# Patient Record
Sex: FEMALE | Race: WHITE | NOT HISPANIC OR LATINO | Employment: UNEMPLOYED | URBAN - NONMETROPOLITAN AREA
[De-identification: names, ages, dates, MRNs, and addresses within clinical notes are randomized per-mention and may not be internally consistent; named-entity substitution may affect disease eponyms.]

---

## 2017-06-09 ENCOUNTER — TELEPHONE (OUTPATIENT)
Dept: GASTROENTEROLOGY | Facility: CLINIC | Age: 52
End: 2017-06-09

## 2017-07-05 DIAGNOSIS — Z12.11 SPECIAL SCREENING FOR MALIGNANT NEOPLASMS, COLON: Primary | ICD-10-CM

## 2017-08-10 RX ORDER — MELOXICAM 15 MG/1
15 TABLET ORAL DAILY
COMMUNITY
End: 2019-07-18

## 2017-08-15 ENCOUNTER — HOSPITAL ENCOUNTER (OUTPATIENT)
Facility: HOSPITAL | Age: 52
Setting detail: HOSPITAL OUTPATIENT SURGERY
Discharge: HOME | End: 2017-08-15
Attending: INTERNAL MEDICINE | Admitting: INTERNAL MEDICINE

## 2017-08-15 ENCOUNTER — ANESTHESIA EVENT (OUTPATIENT)
Dept: PERIOP | Facility: HOSPITAL | Age: 52
End: 2017-08-15

## 2017-08-15 ENCOUNTER — ANESTHESIA (OUTPATIENT)
Dept: PERIOP | Facility: HOSPITAL | Age: 52
End: 2017-08-15

## 2017-08-15 VITALS
HEART RATE: 98 BPM | BODY MASS INDEX: 33.99 KG/M2 | RESPIRATION RATE: 18 BRPM | DIASTOLIC BLOOD PRESSURE: 85 MMHG | HEIGHT: 61 IN | OXYGEN SATURATION: 97 % | SYSTOLIC BLOOD PRESSURE: 122 MMHG | WEIGHT: 180 LBS | TEMPERATURE: 97 F

## 2017-08-15 DIAGNOSIS — Z12.11 SPECIAL SCREENING FOR MALIGNANT NEOPLASMS, COLON: ICD-10-CM

## 2017-08-15 PROCEDURE — 25010000002 PROPOFOL 10 MG/ML EMULSION: Performed by: NURSE ANESTHETIST, CERTIFIED REGISTERED

## 2017-08-15 PROCEDURE — 25010000002 PROPOFOL 1000 MG/ML EMULSION: Performed by: NURSE ANESTHETIST, CERTIFIED REGISTERED

## 2017-08-15 PROCEDURE — 45385 COLONOSCOPY W/LESION REMOVAL: CPT | Performed by: INTERNAL MEDICINE

## 2017-08-15 RX ORDER — SODIUM CHLORIDE, SODIUM LACTATE, POTASSIUM CHLORIDE, CALCIUM CHLORIDE 600; 310; 30; 20 MG/100ML; MG/100ML; MG/100ML; MG/100ML
125 INJECTION, SOLUTION INTRAVENOUS CONTINUOUS
Status: DISCONTINUED | OUTPATIENT
Start: 2017-08-15 | End: 2017-08-15 | Stop reason: HOSPADM

## 2017-08-15 RX ORDER — MEPERIDINE HYDROCHLORIDE 25 MG/ML
12.5 INJECTION INTRAMUSCULAR; INTRAVENOUS; SUBCUTANEOUS
Status: DISCONTINUED | OUTPATIENT
Start: 2017-08-15 | End: 2017-08-15 | Stop reason: HOSPADM

## 2017-08-15 RX ORDER — PROPOFOL 10 MG/ML
VIAL (ML) INTRAVENOUS AS NEEDED
Status: DISCONTINUED | OUTPATIENT
Start: 2017-08-15 | End: 2017-08-15 | Stop reason: SURG

## 2017-08-15 RX ORDER — ONDANSETRON 2 MG/ML
4 INJECTION INTRAMUSCULAR; INTRAVENOUS ONCE AS NEEDED
Status: DISCONTINUED | OUTPATIENT
Start: 2017-08-15 | End: 2017-08-15 | Stop reason: HOSPADM

## 2017-08-15 RX ORDER — SODIUM CHLORIDE 0.9 % (FLUSH) 0.9 %
1-10 SYRINGE (ML) INJECTION AS NEEDED
Status: DISCONTINUED | OUTPATIENT
Start: 2017-08-15 | End: 2017-08-15 | Stop reason: HOSPADM

## 2017-08-15 RX ORDER — OXYCODONE HYDROCHLORIDE AND ACETAMINOPHEN 5; 325 MG/1; MG/1
1 TABLET ORAL ONCE AS NEEDED
Status: DISCONTINUED | OUTPATIENT
Start: 2017-08-15 | End: 2017-08-15 | Stop reason: HOSPADM

## 2017-08-15 RX ORDER — IPRATROPIUM BROMIDE AND ALBUTEROL SULFATE 2.5; .5 MG/3ML; MG/3ML
3 SOLUTION RESPIRATORY (INHALATION) ONCE AS NEEDED
Status: DISCONTINUED | OUTPATIENT
Start: 2017-08-15 | End: 2017-08-15 | Stop reason: HOSPADM

## 2017-08-15 RX ADMIN — SODIUM CHLORIDE, POTASSIUM CHLORIDE, SODIUM LACTATE AND CALCIUM CHLORIDE 125 ML/HR: 600; 310; 30; 20 INJECTION, SOLUTION INTRAVENOUS at 08:19

## 2017-08-15 RX ADMIN — PROPOFOL 140 MCG/KG/MIN: 10 INJECTION, EMULSION INTRAVENOUS at 09:06

## 2017-08-15 RX ADMIN — PROPOFOL 120 MG: 10 INJECTION, EMULSION INTRAVENOUS at 09:06

## 2017-08-15 RX ADMIN — PROPOFOL 50 MG: 10 INJECTION, EMULSION INTRAVENOUS at 09:09

## 2017-08-15 NOTE — ANESTHESIA PREPROCEDURE EVALUATION
Anesthesia Evaluation     NPO Solid Status: > 8 hours  NPO Liquid Status: > 8 hours     Airway   Mallampati: II  TM distance: >3 FB  Neck ROM: full  no difficulty expected  Dental - normal exam     Pulmonary - normal exam   Cardiovascular - normal exam    (+) hypertension,       Neuro/Psych  GI/Hepatic/Renal/Endo    (+)  diabetes mellitus type 1,     Musculoskeletal     Abdominal    Substance History      OB/GYN          Other                                        Anesthesia Plan    ASA 2     general     intravenous induction   Anesthetic plan and risks discussed with patient.

## 2017-08-15 NOTE — OP NOTE
08/15/17    COLONOSCOPY FOR SCREENING with polypectomy Procedure Note    JILLIAN Novoa  8/15/2017    Pre-op Diagnosis: Colon cancer screening, no prior colonoscopy      Post-op Diagnosis: Sigmoid polyps (2), removed        Anesthesia: Per Anesthesia service, general anesthesia      Estimated Blood Loss: Negligible      Findings: Normal rectal examination.  Colonoscopy performed to the cecum, confirmed by identification of typical cecal anatomy.  Bowel preparation was good.  The scope was retroflexed in the rectum.  All areas were examined carefully. Two small (both under 1 cm in size) benign-appearing sessile polyps were found in the distal sigmoid colon--both polyps were removed using the cold snare technique.  The examination was otherwise normal.  She tolerated the procedure very well and there were no complications.  She left the OR in stable and satisfactory condition.      Complications: None      Recommendations:   Findings discussed with the patient  Await biopsy findings  Repeat screening/surveillance colonoscopy in about 5 years      Iain Wallace III, MD     Date: 8/15/2017  Time: 9:21 AM

## 2017-08-15 NOTE — ANESTHESIA POSTPROCEDURE EVALUATION
Patient: JILLIAN Novoa    Procedure Summary     Date Anesthesia Start Anesthesia Stop Room / Location    08/15/17 0901 0921 [facility] / [facility]       Procedure Diagnosis Surgeon Provider    COLONOSCOPY FOR SCREENING (N/A ) Special screening for malignant neoplasms, colon  (Special screening for malignant neoplasms, colon [Z12.11]) MD Anupam Johns III, MD          Anesthesia Type: general  Last vitals  BP   123/84 (08/15/17 0938)    Temp   97 °F (36.1 °C) (08/15/17 0923)    Pulse   99 (08/15/17 0938)   Resp   18 (08/15/17 0938)    SpO2   97 % (08/15/17 0938)      Post Anesthesia Care and Evaluation    Patient location during evaluation: PHASE II  Patient participation: complete - patient participated  Level of consciousness: awake and alert  Pain score: 1  Pain management: adequate  Airway patency: patent  Anesthetic complications: No anesthetic complications  PONV Status: controlled  Cardiovascular status: acceptable  Respiratory status: acceptable  Hydration status: acceptable

## 2017-08-15 NOTE — H&P
"History and physical examination  August 15, 2017    HPI  52-year-old white female presents for screening colonoscopy.  No prior colonoscopy.  Family history is negative for colon polyps/cancer.      Review of Systems   Negative and noncontributory.    ACTIVE PROBLEMS:   Specialty Problems     None          PAST MEDICAL HISTORY:  Past Medical History:   Diagnosis Date   • Arthritis    • Diabetes mellitus    • Hypertension        SURGICAL HISTORY:  Past Surgical History:   Procedure Laterality Date   • VAGINAL DELIVERY  1982; 1983    2 daughters       FAMILY HISTORY:  Family History   Problem Relation Age of Onset   • Arthritis Mother    • Cancer Mother    • Diabetes Mother    • Hypertension Mother    • Cancer Father    • Hypertension Father    • Diabetes Maternal Aunt    • Diabetes Maternal Uncle    • Diabetes Paternal Aunt    • Diabetes Paternal Uncle        SOCIAL HISTORY:  Social History   Substance Use Topics   • Smoking status: Former Smoker     Packs/day: 1.00     Years: 20.00     Start date: 8/29/2016   • Smokeless tobacco: Never Used   • Alcohol use No       CURRENT MEDICATION:    Current Facility-Administered Medications:   •  lactated ringers infusion, 125 mL/hr, Intravenous, Continuous, Anupam Alvarado MD, Last Rate: 125 mL/hr at 08/15/17 0819, 125 mL/hr at 08/15/17 0819  •  sodium chloride 0.9 % flush 1-10 mL, 1-10 mL, Intravenous, PRN, Anupam Alvarado MD     I have reviewed her list of current medications.    ALLERGIES:  Review of patient's allergies indicates no known allergies.    VISIT VITALS:  /93 (BP Location: Left arm, Patient Position: Lying)  Pulse (!) 128 Comment: dr alvarado here  Temp 98.1 °F (36.7 °C) (Tympanic)   Resp 20  Ht 61\" (154.9 cm)  Wt 180 lb (81.6 kg)  SpO2 99%  BMI 34.01 kg/m2    PHYSICAL EXAMINATION:  Physical Exam   Alert, oriented ×3  HEENT: Normal  Neck: No mass  Chest: Clear  Heart: Regular rhythm  Abdomen: Soft, nontender, active BS  Extremities: No " edema  Neuro: No focal deficit    Assessment/Plan   Colon cancer screening    REC  Screening colonoscopy is planned.  The procedure, benefits, risks and alternatives were explained to the patient.         Iain Wallace III, MD

## 2017-08-15 NOTE — PLAN OF CARE
Problem: GI Endoscopy (Adult)  Goal: Signs and Symptoms of Listed Potential Problems Will be Absent or Manageable (GI Endoscopy)  Outcome: Ongoing (interventions implemented as appropriate)    08/15/17 0906   GI Endoscopy   Problems Assessed (GI Endoscopy) all   Problems Present (GI Endoscopy) none

## 2017-08-17 LAB
LAB AP CASE REPORT: NORMAL
Lab: NORMAL
PATH REPORT.FINAL DX SPEC: NORMAL

## 2019-07-18 ENCOUNTER — LAB (OUTPATIENT)
Dept: LAB | Facility: HOSPITAL | Age: 54
End: 2019-07-18

## 2019-07-18 ENCOUNTER — TRANSCRIBE ORDERS (OUTPATIENT)
Dept: ADMINISTRATIVE | Facility: HOSPITAL | Age: 54
End: 2019-07-18

## 2019-07-18 ENCOUNTER — OFFICE VISIT (OUTPATIENT)
Dept: NEUROSURGERY | Facility: CLINIC | Age: 54
End: 2019-07-18

## 2019-07-18 VITALS
DIASTOLIC BLOOD PRESSURE: 77 MMHG | OXYGEN SATURATION: 97 % | RESPIRATION RATE: 18 BRPM | HEIGHT: 65 IN | SYSTOLIC BLOOD PRESSURE: 113 MMHG | WEIGHT: 179 LBS | HEART RATE: 73 BPM | BODY MASS INDEX: 29.82 KG/M2 | TEMPERATURE: 97.8 F

## 2019-07-18 DIAGNOSIS — L68.0 HIRSUTISM: Primary | ICD-10-CM

## 2019-07-18 DIAGNOSIS — M51.36 DEGENERATIVE DISC DISEASE, LUMBAR: Primary | ICD-10-CM

## 2019-07-18 DIAGNOSIS — E11.9 DIABETES MELLITUS, STABLE (HCC): ICD-10-CM

## 2019-07-18 DIAGNOSIS — L68.0 HIRSUTISM: ICD-10-CM

## 2019-07-18 DIAGNOSIS — R53.83 FATIGUE, UNSPECIFIED TYPE: ICD-10-CM

## 2019-07-18 PROBLEM — M51.369 DEGENERATIVE DISC DISEASE, LUMBAR: Status: ACTIVE | Noted: 2019-07-18

## 2019-07-18 LAB
ANION GAP SERPL CALCULATED.3IONS-SCNC: 11.6 MMOL/L (ref 5–15)
BUN BLD-MCNC: 10 MG/DL (ref 6–20)
BUN/CREAT SERPL: 11.8 (ref 7–25)
CALCIUM SPEC-SCNC: 10.2 MG/DL (ref 8.6–10.5)
CHLORIDE SERPL-SCNC: 98 MMOL/L (ref 98–107)
CO2 SERPL-SCNC: 24.4 MMOL/L (ref 22–29)
CREAT BLD-MCNC: 0.85 MG/DL (ref 0.57–1)
ESTRADIOL SERPL HS-MCNC: 9 PG/ML
FSH SERPL-ACNC: 79 MIU/ML
GFR SERPL CREATININE-BSD FRML MDRD: 70 ML/MIN/1.73
GLUCOSE BLD-MCNC: 159 MG/DL (ref 65–99)
HBA1C MFR BLD: 8.45 % (ref 4.8–5.6)
LH SERPL-ACNC: 39.8 MIU/ML
POTASSIUM BLD-SCNC: 4.4 MMOL/L (ref 3.5–5.2)
PROLACTIN SERPL-MCNC: 6.22 NG/ML (ref 4.79–23.3)
SODIUM BLD-SCNC: 134 MMOL/L (ref 136–145)
TESTOST SERPL-MCNC: 4.27 NG/DL (ref 2.9–40.8)
TSH SERPL DL<=0.05 MIU/L-ACNC: 5.17 MIU/ML (ref 0.27–4.2)

## 2019-07-18 PROCEDURE — 84403 ASSAY OF TOTAL TESTOSTERONE: CPT

## 2019-07-18 PROCEDURE — 84146 ASSAY OF PROLACTIN: CPT

## 2019-07-18 PROCEDURE — 36415 COLL VENOUS BLD VENIPUNCTURE: CPT

## 2019-07-18 PROCEDURE — 83001 ASSAY OF GONADOTROPIN (FSH): CPT

## 2019-07-18 PROCEDURE — 99203 OFFICE O/P NEW LOW 30 MIN: CPT | Performed by: NEUROLOGICAL SURGERY

## 2019-07-18 PROCEDURE — 84443 ASSAY THYROID STIM HORMONE: CPT

## 2019-07-18 PROCEDURE — 83036 HEMOGLOBIN GLYCOSYLATED A1C: CPT

## 2019-07-18 PROCEDURE — 82670 ASSAY OF TOTAL ESTRADIOL: CPT

## 2019-07-18 PROCEDURE — 83002 ASSAY OF GONADOTROPIN (LH): CPT

## 2019-07-18 PROCEDURE — 80048 BASIC METABOLIC PNL TOTAL CA: CPT

## 2019-07-18 RX ORDER — CELECOXIB 200 MG/1
200 CAPSULE ORAL 2 TIMES DAILY
Qty: 60 CAPSULE | Refills: 0 | Status: SHIPPED | OUTPATIENT
Start: 2019-07-18

## 2019-07-18 NOTE — PROGRESS NOTES
"JILLIAN Novoa  1965  [id number]      Chief Complaint   Patient presents with   • Back Pain       HISTORY OF PRESENT ILLNESS: This is a 53-year-old female presenting with a history of chronic thoracic and low back pain; pain in her right knee and shoulders.  Pain is primarily axial and is associated with \"burning\".  It is nondermatomal.  The symptoms are not those usually associated with nerve root or spinal cord compression.  She has been to physical therapy without benefit.  She has been on diclofenac which has not helped.  Thoracic and lumbar MRIs were performed she referred for neurosurgical consultation.    Past Medical History:   Diagnosis Date   • Arthritis    • Diabetes mellitus (CMS/HCC)    • Hypertension        Past Surgical History:   Procedure Laterality Date   • COLONOSCOPY N/A 8/15/2017    Procedure: COLONOSCOPY FOR SCREENING;  Surgeon: Iain Wallace III, MD;  Location: [facility];  Service:    • VAGINAL DELIVERY  1982; 1983    2 daughters       Family History   Problem Relation Age of Onset   • Arthritis Mother    • Cancer Mother    • Diabetes Mother    • Hypertension Mother    • Cancer Father    • Hypertension Father    • Diabetes Maternal Aunt    • Diabetes Maternal Uncle    • Diabetes Paternal Aunt    • Diabetes Paternal Uncle        Social History     Socioeconomic History   • Marital status:      Spouse name: Krishan    • Number of children: 2   • Years of education: 7   • Highest education level: Not on file   Occupational History     Comment: homemaker   Tobacco Use   • Smoking status: Former Smoker     Packs/day: 1.00     Years: 20.00     Pack years: 20.00     Start date: 8/29/2016   • Smokeless tobacco: Never Used   Substance and Sexual Activity   • Alcohol use: No   • Drug use: No   • Sexual activity: Defer     Partners: Male     Comment: Krishan Novoa       No Known Allergies      Current Outpatient Medications:   •  amLODIPine (NORVASC) 10 MG tablet, , Disp: , Rfl: 5  •  " "atenolol-chlorthalidone (TENORETIC) 50-25 MG per tablet, , Disp: , Rfl: 5  •  baclofen (LIORESAL) 20 MG tablet, , Disp: , Rfl: 5  •  cetirizine (ZyrTEC) 10 MG tablet, , Disp: , Rfl: 5  •  diclofenac (VOLTAREN) 75 MG EC tablet, , Disp: , Rfl: 5  •  EASY TOUCH PEN NEEDLES 31G X 5 MM misc, , Disp: , Rfl: 5  •  fluticasone (FLONASE) 50 MCG/ACT nasal spray, , Disp: , Rfl: 2  •  gabapentin (NEURONTIN) 300 MG capsule, , Disp: , Rfl: 5  •  ibuprofen (ADVIL,MOTRIN) 600 MG tablet, , Disp: , Rfl: 2  •  pravastatin (PRAVACHOL) 40 MG tablet, , Disp: , Rfl: 5  •  sertraline (ZOLOFT) 50 MG tablet, , Disp: , Rfl: 5    Review of Systems   Musculoskeletal: Positive for back pain and myalgias.       Vitals:    07/18/19 0942   BP: 113/77   BP Location: Left arm   Patient Position: Sitting   Pulse: 73   Resp: 18   Temp: 97.8 °F (36.6 °C)   SpO2: 97%   Weight: 81.2 kg (179 lb)   Height: 165.1 cm (65\")       Neurological Examination:  Mental status/speech: The patient is alert and oriented.  Speech is clear without aphysia or dysarthria.  No overt cognitive deficits.    Cranial nerve examination:    Olfaction: Smell is intact.  Vision: Vision is intact without visual field abnormalities.  Funduscopic examination is normal.  No pupillary irregularity.  Ocular motor examination: The extraocular muscles are intact.  There is no diplopia.  The pupil is round and reactive to both light and accommodation.  There is no nystagmus.  Facial movement/sensation: There is no facial weakness.  Sensation is intact in the first, second, and third divisions of the trigeminal nerve.  The corneal reflex is intact.  Auditory: Hearing is intact to finger rub bilaterally.  Cranial nerves IX, X, XI, XII: Phonation is normal.  No dysphagia.  Tongue is protruded in the midline without atrophy.  The gag reflex is intact.  Shoulder shrug is normal.    Musculoligamentous ligamentous examination: She has limitation of range of motion of the thoracic and lumbar spine. "  Straight leg raising, Lasègue and flip test are all within normal limits.  I am unable to find evidence of weakness, sensory loss or reflex asymmetry.  There is no Babinski Floyd or clonus.            Medical Decision Making:     Diagnostic Data Set: I have reviewed her diagnostic studies including MRI.  These data set show the presence of degenerative disc disease without focal disc protrusion, spondylolisthesis or lateral recess closure.      Assessment: Degenerative osteoarthritis          Recommendations: Unfortunately she does not have a condition that can be helped surgically.  I have given her a prescription of Celebrex 200 mg twice daily to see if this will be efficacious.  I have also suggested consideration of pain management with a dorsal column stimulator.  This referral will need to come from her primary healthcare provider in view of her insurance.        I greatly appreciate the opportunity to see and evaluate this individual.  If you have questions or concerns regarding issues that I may have overlooked please call me at any time: [phone].  Juan R Larkin M.D.  [facility]  [address] [place] [address]  Scribed for Eren Larkin MD by Letty Lindsey CMA. 7/18/2019  10:11 AM  I have read and concur with the information provided by the scribe.  Eren Larkin MD

## 2020-02-18 ENCOUNTER — LAB (OUTPATIENT)
Dept: LAB | Facility: HOSPITAL | Age: 55
End: 2020-02-18

## 2020-02-18 ENCOUNTER — TRANSCRIBE ORDERS (OUTPATIENT)
Dept: ADMINISTRATIVE | Facility: HOSPITAL | Age: 55
End: 2020-02-18

## 2020-02-18 DIAGNOSIS — E78.5 HYPERLIPIDEMIA, UNSPECIFIED HYPERLIPIDEMIA TYPE: ICD-10-CM

## 2020-02-18 DIAGNOSIS — Z51.81 ENCOUNTER FOR THERAPEUTIC DRUG MONITORING: ICD-10-CM

## 2020-02-18 DIAGNOSIS — I10 ESSENTIAL (PRIMARY) HYPERTENSION: ICD-10-CM

## 2020-02-18 DIAGNOSIS — E55.9 VITAMIN D DEFICIENCY: ICD-10-CM

## 2020-02-18 DIAGNOSIS — E03.9 HYPOTHYROIDISM, UNSPECIFIED TYPE: ICD-10-CM

## 2020-02-18 DIAGNOSIS — E11.9 TYPE 2 DIABETES MELLITUS WITHOUT COMPLICATION, UNSPECIFIED WHETHER LONG TERM INSULIN USE (HCC): Primary | ICD-10-CM

## 2020-02-18 DIAGNOSIS — E11.9 TYPE 2 DIABETES MELLITUS WITHOUT COMPLICATION, UNSPECIFIED WHETHER LONG TERM INSULIN USE (HCC): ICD-10-CM

## 2020-02-18 PROCEDURE — G0480 DRUG TEST DEF 1-7 CLASSES: HCPCS

## 2020-02-18 PROCEDURE — 85025 COMPLETE CBC W/AUTO DIFF WBC: CPT

## 2020-02-18 PROCEDURE — 80061 LIPID PANEL: CPT

## 2020-02-18 PROCEDURE — 82306 VITAMIN D 25 HYDROXY: CPT

## 2020-02-18 PROCEDURE — 84436 ASSAY OF TOTAL THYROXINE: CPT

## 2020-02-18 PROCEDURE — 83036 HEMOGLOBIN GLYCOSYLATED A1C: CPT

## 2020-02-18 PROCEDURE — 36415 COLL VENOUS BLD VENIPUNCTURE: CPT

## 2020-02-18 PROCEDURE — 84443 ASSAY THYROID STIM HORMONE: CPT

## 2020-02-18 PROCEDURE — 80053 COMPREHEN METABOLIC PANEL: CPT

## 2020-02-19 LAB
25(OH)D3 SERPL-MCNC: 44.3 NG/ML (ref 30–100)
ALBUMIN SERPL-MCNC: 4.8 G/DL (ref 3.5–5.2)
ALBUMIN/GLOB SERPL: 1.6 G/DL
ALP SERPL-CCNC: 61 U/L (ref 39–117)
ALT SERPL W P-5'-P-CCNC: 19 U/L (ref 1–33)
ANION GAP SERPL CALCULATED.3IONS-SCNC: 13.2 MMOL/L (ref 5–15)
AST SERPL-CCNC: 23 U/L (ref 1–32)
BASOPHILS # BLD AUTO: 0.03 10*3/MM3 (ref 0–0.2)
BASOPHILS NFR BLD AUTO: 0.5 % (ref 0–1.5)
BILIRUB SERPL-MCNC: 0.4 MG/DL (ref 0.2–1.2)
BUN BLD-MCNC: 11 MG/DL (ref 6–20)
BUN/CREAT SERPL: 16.9 (ref 7–25)
CALCIUM SPEC-SCNC: 10.5 MG/DL (ref 8.6–10.5)
CHLORIDE SERPL-SCNC: 101 MMOL/L (ref 98–107)
CHOLEST SERPL-MCNC: 170 MG/DL (ref 0–200)
CO2 SERPL-SCNC: 22.8 MMOL/L (ref 22–29)
CREAT BLD-MCNC: 0.65 MG/DL (ref 0.57–1)
DEPRECATED RDW RBC AUTO: 42.5 FL (ref 37–54)
EOSINOPHIL # BLD AUTO: 0.1 10*3/MM3 (ref 0–0.4)
EOSINOPHIL NFR BLD AUTO: 1.6 % (ref 0.3–6.2)
ERYTHROCYTE [DISTWIDTH] IN BLOOD BY AUTOMATED COUNT: 12.7 % (ref 12.3–15.4)
GFR SERPL CREATININE-BSD FRML MDRD: 95 ML/MIN/1.73
GLOBULIN UR ELPH-MCNC: 3 GM/DL
GLUCOSE BLD-MCNC: 85 MG/DL (ref 65–99)
HBA1C MFR BLD: 8.33 % (ref 4.8–5.6)
HCT VFR BLD AUTO: 44.5 % (ref 34–46.6)
HDLC SERPL-MCNC: 36 MG/DL (ref 40–60)
HGB BLD-MCNC: 14.8 G/DL (ref 12–15.9)
IMM GRANULOCYTES # BLD AUTO: 0.03 10*3/MM3 (ref 0–0.05)
IMM GRANULOCYTES NFR BLD AUTO: 0.5 % (ref 0–0.5)
LDLC SERPL CALC-MCNC: 105 MG/DL (ref 0–100)
LDLC/HDLC SERPL: 2.93 {RATIO}
LYMPHOCYTES # BLD AUTO: 2.2 10*3/MM3 (ref 0.7–3.1)
LYMPHOCYTES NFR BLD AUTO: 35.4 % (ref 19.6–45.3)
MCH RBC QN AUTO: 30.7 PG (ref 26.6–33)
MCHC RBC AUTO-ENTMCNC: 33.3 G/DL (ref 31.5–35.7)
MCV RBC AUTO: 92.3 FL (ref 79–97)
MONOCYTES # BLD AUTO: 0.54 10*3/MM3 (ref 0.1–0.9)
MONOCYTES NFR BLD AUTO: 8.7 % (ref 5–12)
NEUTROPHILS # BLD AUTO: 3.32 10*3/MM3 (ref 1.7–7)
NEUTROPHILS NFR BLD AUTO: 53.3 % (ref 42.7–76)
NRBC BLD AUTO-RTO: 0.2 /100 WBC (ref 0–0.2)
PLATELET # BLD AUTO: 222 10*3/MM3 (ref 140–450)
PMV BLD AUTO: 11.9 FL (ref 6–12)
POTASSIUM BLD-SCNC: 4 MMOL/L (ref 3.5–5.2)
PROT SERPL-MCNC: 7.8 G/DL (ref 6–8.5)
RBC # BLD AUTO: 4.82 10*6/MM3 (ref 3.77–5.28)
SODIUM BLD-SCNC: 137 MMOL/L (ref 136–145)
T4 SERPL-MCNC: 9.51 MCG/DL (ref 4.5–11.7)
TRIGL SERPL-MCNC: 143 MG/DL (ref 0–150)
TSH SERPL DL<=0.05 MIU/L-ACNC: 3.52 UIU/ML (ref 0.27–4.2)
VLDLC SERPL-MCNC: 28.6 MG/DL (ref 5–40)
WBC NRBC COR # BLD: 6.22 10*3/MM3 (ref 3.4–10.8)

## 2020-02-20 LAB — GABAPENTIN UR-MCNC: 69.1 UG/ML

## 2021-03-05 ENCOUNTER — APPOINTMENT (OUTPATIENT)
Dept: MAMMOGRAPHY | Facility: HOSPITAL | Age: 56
End: 2021-03-05

## 2021-03-29 ENCOUNTER — HOSPITAL ENCOUNTER (OUTPATIENT)
Dept: MAMMOGRAPHY | Facility: HOSPITAL | Age: 56
Discharge: HOME | End: 2021-03-29
Admitting: NURSE PRACTITIONER

## 2021-03-29 DIAGNOSIS — Z12.31 VISIT FOR SCREENING MAMMOGRAM: ICD-10-CM

## 2021-03-29 PROCEDURE — 77067 SCR MAMMO BI INCL CAD: CPT | Performed by: RADIOLOGY

## 2021-03-29 PROCEDURE — 77067 SCR MAMMO BI INCL CAD: CPT

## 2021-03-29 PROCEDURE — 77063 BREAST TOMOSYNTHESIS BI: CPT | Performed by: RADIOLOGY

## 2021-03-29 PROCEDURE — 77063 BREAST TOMOSYNTHESIS BI: CPT

## 2022-03-30 ENCOUNTER — HOSPITAL ENCOUNTER (OUTPATIENT)
Dept: MAMMOGRAPHY | Facility: HOSPITAL | Age: 57
Discharge: HOME | End: 2022-03-30
Admitting: NURSE PRACTITIONER

## 2022-03-30 DIAGNOSIS — Z12.31 VISIT FOR SCREENING MAMMOGRAM: ICD-10-CM

## 2022-03-30 PROCEDURE — 77063 BREAST TOMOSYNTHESIS BI: CPT

## 2022-03-30 PROCEDURE — 77063 BREAST TOMOSYNTHESIS BI: CPT | Performed by: RADIOLOGY

## 2022-03-30 PROCEDURE — 77067 SCR MAMMO BI INCL CAD: CPT

## 2022-03-30 PROCEDURE — 77067 SCR MAMMO BI INCL CAD: CPT | Performed by: RADIOLOGY

## 2022-07-12 ENCOUNTER — TRANSCRIBE ORDERS (OUTPATIENT)
Dept: ADMINISTRATIVE | Facility: HOSPITAL | Age: 57
End: 2022-07-12

## 2022-07-12 DIAGNOSIS — F17.200 TOBACCO USE DISORDER: Primary | ICD-10-CM

## 2023-01-05 ENCOUNTER — TRANSCRIBE ORDERS (OUTPATIENT)
Dept: ADMINISTRATIVE | Facility: HOSPITAL | Age: 58
End: 2023-01-05
Payer: COMMERCIAL

## 2023-01-05 DIAGNOSIS — F17.210 CIGARETTE SMOKER: Primary | ICD-10-CM

## 2023-02-06 ENCOUNTER — HOSPITAL ENCOUNTER (OUTPATIENT)
Dept: CT IMAGING | Facility: HOSPITAL | Age: 58
Discharge: HOME | End: 2023-02-06
Admitting: NURSE PRACTITIONER
Payer: COMMERCIAL

## 2023-02-06 DIAGNOSIS — F17.210 CIGARETTE SMOKER: ICD-10-CM

## 2023-02-06 PROCEDURE — 71271 CT THORAX LUNG CANCER SCR C-: CPT

## 2023-02-06 PROCEDURE — 71271 CT THORAX LUNG CANCER SCR C-: CPT | Performed by: RADIOLOGY

## 2023-02-21 ENCOUNTER — OFFICE VISIT (OUTPATIENT)
Dept: GASTROENTEROLOGY | Facility: CLINIC | Age: 58
End: 2023-02-21
Payer: COMMERCIAL

## 2023-02-21 VITALS — BODY MASS INDEX: 28.12 KG/M2 | WEIGHT: 168.8 LBS | HEIGHT: 65 IN

## 2023-02-21 DIAGNOSIS — Z12.11 ENCOUNTER FOR SCREENING FOR MALIGNANT NEOPLASM OF COLON: ICD-10-CM

## 2023-02-21 DIAGNOSIS — R19.5 POSITIVE COLORECTAL CANCER SCREENING USING COLOGUARD TEST: Primary | ICD-10-CM

## 2023-02-21 PROCEDURE — 99204 OFFICE O/P NEW MOD 45 MIN: CPT | Performed by: PHYSICIAN ASSISTANT

## 2023-02-21 RX ORDER — POLYETHYLENE GLYCOL 3350 17 G/17G
510 POWDER, FOR SOLUTION ORAL TAKE AS DIRECTED
Qty: 510 G | Refills: 0 | Status: SHIPPED | OUTPATIENT
Start: 2023-02-21

## 2023-02-21 RX ORDER — BISACODYL 5 MG/1
20 TABLET, DELAYED RELEASE ORAL ONCE
Qty: 4 TABLET | Refills: 0 | Status: SHIPPED | OUTPATIENT
Start: 2023-02-21 | End: 2023-02-21

## 2023-02-21 NOTE — PROGRESS NOTES
Chief Complaint   Patient presents with   • positive cologuard       Zakia Novoa is a 57 y.o. female who presents to the office today for evaluation of positive cologuard  .    HPI  Patient presents to the clinic for evaluation of positive Cologuard testing done by her primary care provider. The patient notes her last colonoscopy was 5 years ago. She had 2 polyps removed.  She denies any current GI complaints.  Denies any bright red blood per rectum or melena.  She does admit to a prior episode of rectal bleeding/constipation prior to doing the positive Cologuard testing.  Patient denies any family history of GI cancer or colon cancer.    Review of Systems   Constitutional: Negative for fever.   HENT: Negative.    Eyes: Negative.    Respiratory: Negative.    Cardiovascular: Negative.    Gastrointestinal: Negative for abdominal distention, abdominal pain, anal bleeding, blood in stool, constipation, diarrhea, nausea and vomiting.   Endocrine: Negative.    Genitourinary: Negative.    Musculoskeletal: Negative.    Skin: Negative.    Allergic/Immunologic: Negative.    Neurological: Negative.    Hematological: Negative.    Psychiatric/Behavioral: Negative.        ACTIVE PROBLEMS:   Specialty Problems    None      PAST MEDICAL HISTORY:  Past Medical History:   Diagnosis Date   • Arthritis    • Diabetes mellitus (HCC)    • Hypertension        SURGICAL HISTORY:  Past Surgical History:   Procedure Laterality Date   • COLONOSCOPY N/A 8/15/2017    Procedure: COLONOSCOPY FOR SCREENING;  Surgeon: Iain Wallace III, MD;  Location: [facility];  Service:    • VAGINAL DELIVERY  1982; 1983    2 daughters       FAMILY HISTORY:  Family History   Problem Relation Age of Onset   • Arthritis Mother    • Cancer Mother    • Diabetes Mother    • Hypertension Mother    • Cancer Father    • Hypertension Father    • Diabetes Maternal Aunt    • Diabetes Maternal Uncle    • Diabetes Paternal Aunt    • Diabetes Paternal Uncle    • Breast  "cancer Neg Hx        SOCIAL HISTORY:  Social History     Tobacco Use   • Smoking status: Former     Packs/day: 1.00     Years: 20.00     Pack years: 20.00     Types: Cigarettes     Start date: 8/29/2016   • Smokeless tobacco: Never   Substance Use Topics   • Alcohol use: No       CURRENT MEDICATION:    Current Outpatient Medications:   •  amLODIPine (NORVASC) 10 MG tablet, , Disp: , Rfl: 5  •  atenolol-chlorthalidone (TENORETIC) 50-25 MG per tablet, , Disp: , Rfl: 5  •  baclofen (LIORESAL) 20 MG tablet, , Disp: , Rfl: 5  •  celecoxib (CeleBREX) 200 MG capsule, Take 1 capsule by mouth 2 (Two) Times a Day., Disp: 60 capsule, Rfl: 0  •  cetirizine (ZyrTEC) 10 MG tablet, , Disp: , Rfl: 5  •  EASY TOUCH PEN NEEDLES 31G X 5 MM misc, , Disp: , Rfl: 5  •  fluticasone (FLONASE) 50 MCG/ACT nasal spray, , Disp: , Rfl: 2  •  gabapentin (NEURONTIN) 300 MG capsule, 800 mg 3 (Three) Times a Day., Disp: , Rfl: 5  •  pravastatin (PRAVACHOL) 40 MG tablet, , Disp: , Rfl: 5  •  sertraline (ZOLOFT) 50 MG tablet, , Disp: , Rfl: 5  •  polyethylene glycol (MIRALAX) 17 GM/SCOOP powder, Take 510 g by mouth Take As Directed. Place 15 cap fulls of powder in 32 oz of liquid of choice at 4:00 and 10:00 PM, Disp: 510 g, Rfl: 0    ALLERGIES:  Patient has no known allergies.    VISIT VITALS:  Ht 165.1 cm (65\")   Wt 76.6 kg (168 lb 12.8 oz)   BMI 28.09 kg/m²   Physical Exam  Constitutional:       General: She is not in acute distress.     Appearance: Normal appearance. She is well-developed.   HENT:      Head: Normocephalic and atraumatic.   Eyes:      Pupils: Pupils are equal, round, and reactive to light.   Cardiovascular:      Rate and Rhythm: Normal rate and regular rhythm.      Heart sounds: Normal heart sounds.   Pulmonary:      Effort: Pulmonary effort is normal. No respiratory distress.      Breath sounds: Normal breath sounds. No wheezing, rhonchi or rales.   Abdominal:      General: Abdomen is flat. Bowel sounds are normal. There is no " distension.      Palpations: Abdomen is soft. There is no mass.      Tenderness: There is no abdominal tenderness. There is no guarding or rebound.      Hernia: No hernia is present.   Musculoskeletal:         General: No swelling. Normal range of motion.      Cervical back: Normal range of motion and neck supple.      Right lower leg: No edema.      Left lower leg: No edema.   Skin:     General: Skin is warm and dry.   Neurological:      Mental Status: She is alert and oriented to person, place, and time.   Psychiatric:         Attention and Perception: Attention normal.         Mood and Affect: Mood normal.         Speech: Speech normal.         Behavior: Behavior normal. Behavior is cooperative.         Thought Content: Thought content normal.         Assessment      1. Positive Cologuard test  - Colonoscopy will be obtained.  - miralax colon prep was sent to the pharmacy      Diagnosis Plan   1. Positive colorectal cancer screening using Cologuard test  bisacodyl (DULCOLAX) 5 MG EC tablet    polyethylene glycol (MIRALAX) 17 GM/SCOOP powder    Case Request    Follow Anesthesia Guidelines / Protocol    Obtain Informed Consent    Case Request      2. Encounter for screening for malignant neoplasm of colon  bisacodyl (DULCOLAX) 5 MG EC tablet    polyethylene glycol (MIRALAX) 17 GM/SCOOP powder    Case Request    Follow Anesthesia Guidelines / Protocol    Obtain Informed Consent    Case Request          Return for after procedure follow-up.               This document has been electronically signed by Porsche Franklin PA-C  February 22, 2023 07:56 EST    Part of this note may be an electronic transcription/translation of spoken language to printed text using the Dragon Dictation System.      Transcribed from ambient dictation for Porsche Franklin PA-C by Patti Shankar.  02/21/23   15:25 EST    Patient or patient representative verbalized consent to the visit recording.

## 2023-03-01 PROBLEM — R19.5 POSITIVE COLORECTAL CANCER SCREENING USING COLOGUARD TEST: Status: ACTIVE | Noted: 2023-03-01

## 2023-03-01 PROBLEM — Z12.11 ENCOUNTER FOR SCREENING FOR MALIGNANT NEOPLASM OF COLON: Status: ACTIVE | Noted: 2023-03-01

## 2023-03-31 ENCOUNTER — HOSPITAL ENCOUNTER (OUTPATIENT)
Dept: MAMMOGRAPHY | Facility: HOSPITAL | Age: 58
Discharge: HOME | End: 2023-03-31
Admitting: NURSE PRACTITIONER
Payer: COMMERCIAL

## 2023-03-31 DIAGNOSIS — Z12.31 VISIT FOR SCREENING MAMMOGRAM: ICD-10-CM

## 2023-03-31 PROCEDURE — 77063 BREAST TOMOSYNTHESIS BI: CPT

## 2023-03-31 PROCEDURE — 77067 SCR MAMMO BI INCL CAD: CPT

## 2023-03-31 PROCEDURE — 77063 BREAST TOMOSYNTHESIS BI: CPT | Performed by: RADIOLOGY

## 2023-03-31 PROCEDURE — 77067 SCR MAMMO BI INCL CAD: CPT | Performed by: RADIOLOGY

## 2023-04-19 ENCOUNTER — TELEPHONE (OUTPATIENT)
Dept: GASTROENTEROLOGY | Facility: CLINIC | Age: 58
End: 2023-04-19

## 2023-04-21 DIAGNOSIS — R19.5 POSITIVE COLORECTAL CANCER SCREENING USING COLOGUARD TEST: Primary | ICD-10-CM

## 2023-04-21 RX ORDER — BISACODYL 5 MG/1
20 TABLET, DELAYED RELEASE ORAL ONCE
Qty: 4 TABLET | Refills: 0 | Status: SHIPPED | OUTPATIENT
Start: 2023-04-21 | End: 2023-04-21

## 2023-04-21 RX ORDER — POLYETHYLENE GLYCOL 3350 17 G/17G
510 POWDER, FOR SOLUTION ORAL ONCE
Qty: 510 G | Refills: 0 | Status: SHIPPED | OUTPATIENT
Start: 2023-04-21 | End: 2023-04-21

## 2023-05-02 ENCOUNTER — HOSPITAL ENCOUNTER (OUTPATIENT)
Facility: HOSPITAL | Age: 58
Setting detail: HOSPITAL OUTPATIENT SURGERY
Discharge: HOME | End: 2023-05-02
Attending: INTERNAL MEDICINE | Admitting: INTERNAL MEDICINE
Payer: COMMERCIAL

## 2023-05-02 ENCOUNTER — ANESTHESIA EVENT (OUTPATIENT)
Dept: PERIOP | Facility: HOSPITAL | Age: 58
End: 2023-05-02
Payer: COMMERCIAL

## 2023-05-02 ENCOUNTER — ANESTHESIA (OUTPATIENT)
Dept: PERIOP | Facility: HOSPITAL | Age: 58
End: 2023-05-02
Payer: COMMERCIAL

## 2023-05-02 VITALS
SYSTOLIC BLOOD PRESSURE: 129 MMHG | WEIGHT: 163 LBS | OXYGEN SATURATION: 98 % | RESPIRATION RATE: 18 BRPM | DIASTOLIC BLOOD PRESSURE: 81 MMHG | HEIGHT: 64 IN | HEART RATE: 76 BPM | BODY MASS INDEX: 27.83 KG/M2 | TEMPERATURE: 97.2 F

## 2023-05-02 DIAGNOSIS — R19.5 POSITIVE COLORECTAL CANCER SCREENING USING COLOGUARD TEST: ICD-10-CM

## 2023-05-02 DIAGNOSIS — Z12.11 ENCOUNTER FOR SCREENING FOR MALIGNANT NEOPLASM OF COLON: ICD-10-CM

## 2023-05-02 LAB — GLUCOSE BLDC GLUCOMTR-MCNC: 173 MG/DL (ref 70–130)

## 2023-05-02 PROCEDURE — 82948 REAGENT STRIP/BLOOD GLUCOSE: CPT

## 2023-05-02 PROCEDURE — 25010000002 PROPOFOL 200 MG/20ML EMULSION: Performed by: NURSE ANESTHETIST, CERTIFIED REGISTERED

## 2023-05-02 RX ORDER — LEVOTHYROXINE SODIUM 0.05 MG/1
50 TABLET ORAL DAILY
COMMUNITY

## 2023-05-02 RX ORDER — SODIUM CHLORIDE 9 MG/ML
40 INJECTION, SOLUTION INTRAVENOUS AS NEEDED
Status: DISCONTINUED | OUTPATIENT
Start: 2023-05-02 | End: 2023-05-02 | Stop reason: HOSPADM

## 2023-05-02 RX ORDER — SODIUM CHLORIDE, SODIUM LACTATE, POTASSIUM CHLORIDE, CALCIUM CHLORIDE 600; 310; 30; 20 MG/100ML; MG/100ML; MG/100ML; MG/100ML
125 INJECTION, SOLUTION INTRAVENOUS ONCE
Status: COMPLETED | OUTPATIENT
Start: 2023-05-02 | End: 2023-05-02

## 2023-05-02 RX ORDER — KETOROLAC TROMETHAMINE 30 MG/ML
30 INJECTION, SOLUTION INTRAMUSCULAR; INTRAVENOUS EVERY 6 HOURS PRN
Status: DISCONTINUED | OUTPATIENT
Start: 2023-05-02 | End: 2023-05-02 | Stop reason: HOSPADM

## 2023-05-02 RX ORDER — FENTANYL CITRATE 50 UG/ML
50 INJECTION, SOLUTION INTRAMUSCULAR; INTRAVENOUS
Status: DISCONTINUED | OUTPATIENT
Start: 2023-05-02 | End: 2023-05-02 | Stop reason: HOSPADM

## 2023-05-02 RX ORDER — ATENOLOL 50 MG/1
50 TABLET ORAL DAILY
COMMUNITY

## 2023-05-02 RX ORDER — PROPOFOL 10 MG/ML
INJECTION, EMULSION INTRAVENOUS AS NEEDED
Status: DISCONTINUED | OUTPATIENT
Start: 2023-05-02 | End: 2023-05-02 | Stop reason: SURG

## 2023-05-02 RX ORDER — IPRATROPIUM BROMIDE AND ALBUTEROL SULFATE 2.5; .5 MG/3ML; MG/3ML
3 SOLUTION RESPIRATORY (INHALATION) ONCE AS NEEDED
Status: DISCONTINUED | OUTPATIENT
Start: 2023-05-02 | End: 2023-05-02 | Stop reason: HOSPADM

## 2023-05-02 RX ORDER — SODIUM CHLORIDE 0.9 % (FLUSH) 0.9 %
10 SYRINGE (ML) INJECTION AS NEEDED
Status: DISCONTINUED | OUTPATIENT
Start: 2023-05-02 | End: 2023-05-02 | Stop reason: HOSPADM

## 2023-05-02 RX ORDER — LIDOCAINE HYDROCHLORIDE 20 MG/ML
INJECTION, SOLUTION EPIDURAL; INFILTRATION; INTRACAUDAL; PERINEURAL AS NEEDED
Status: DISCONTINUED | OUTPATIENT
Start: 2023-05-02 | End: 2023-05-02 | Stop reason: SURG

## 2023-05-02 RX ORDER — ONDANSETRON 2 MG/ML
4 INJECTION INTRAMUSCULAR; INTRAVENOUS AS NEEDED
Status: DISCONTINUED | OUTPATIENT
Start: 2023-05-02 | End: 2023-05-02 | Stop reason: HOSPADM

## 2023-05-02 RX ORDER — OXYCODONE HYDROCHLORIDE AND ACETAMINOPHEN 5; 325 MG/1; MG/1
1 TABLET ORAL ONCE AS NEEDED
Status: DISCONTINUED | OUTPATIENT
Start: 2023-05-02 | End: 2023-05-02 | Stop reason: HOSPADM

## 2023-05-02 RX ORDER — SODIUM CHLORIDE 0.9 % (FLUSH) 0.9 %
10 SYRINGE (ML) INJECTION EVERY 12 HOURS SCHEDULED
Status: DISCONTINUED | OUTPATIENT
Start: 2023-05-02 | End: 2023-05-02 | Stop reason: HOSPADM

## 2023-05-02 RX ORDER — HYDROCHLOROTHIAZIDE 25 MG/1
25 TABLET ORAL DAILY
COMMUNITY

## 2023-05-02 RX ORDER — MIDAZOLAM HYDROCHLORIDE 1 MG/ML
1 INJECTION INTRAMUSCULAR; INTRAVENOUS
Status: DISCONTINUED | OUTPATIENT
Start: 2023-05-02 | End: 2023-05-02 | Stop reason: HOSPADM

## 2023-05-02 RX ORDER — MEPERIDINE HYDROCHLORIDE 25 MG/ML
12.5 INJECTION INTRAMUSCULAR; INTRAVENOUS; SUBCUTANEOUS
Status: DISCONTINUED | OUTPATIENT
Start: 2023-05-02 | End: 2023-05-02 | Stop reason: HOSPADM

## 2023-05-02 RX ORDER — METHOCARBAMOL 750 MG/1
750 TABLET, FILM COATED ORAL 2 TIMES DAILY PRN
COMMUNITY
Start: 2023-04-21

## 2023-05-02 RX ORDER — SODIUM CHLORIDE, SODIUM LACTATE, POTASSIUM CHLORIDE, CALCIUM CHLORIDE 600; 310; 30; 20 MG/100ML; MG/100ML; MG/100ML; MG/100ML
100 INJECTION, SOLUTION INTRAVENOUS ONCE AS NEEDED
Status: DISCONTINUED | OUTPATIENT
Start: 2023-05-02 | End: 2023-05-02 | Stop reason: HOSPADM

## 2023-05-02 RX ADMIN — PROPOFOL 50 MG: 10 INJECTION, EMULSION INTRAVENOUS at 08:16

## 2023-05-02 RX ADMIN — PROPOFOL 50 MG: 10 INJECTION, EMULSION INTRAVENOUS at 08:10

## 2023-05-02 RX ADMIN — PROPOFOL 50 MG: 10 INJECTION, EMULSION INTRAVENOUS at 07:52

## 2023-05-02 RX ADMIN — PROPOFOL 50 MG: 10 INJECTION, EMULSION INTRAVENOUS at 08:12

## 2023-05-02 RX ADMIN — PROPOFOL 50 MG: 10 INJECTION, EMULSION INTRAVENOUS at 08:21

## 2023-05-02 RX ADMIN — PROPOFOL 50 MG: 10 INJECTION, EMULSION INTRAVENOUS at 07:45

## 2023-05-02 RX ADMIN — PROPOFOL 50 MG: 10 INJECTION, EMULSION INTRAVENOUS at 07:48

## 2023-05-02 RX ADMIN — PROPOFOL 50 MG: 10 INJECTION, EMULSION INTRAVENOUS at 08:03

## 2023-05-02 RX ADMIN — SODIUM CHLORIDE, POTASSIUM CHLORIDE, SODIUM LACTATE AND CALCIUM CHLORIDE: 600; 310; 30; 20 INJECTION, SOLUTION INTRAVENOUS at 07:33

## 2023-05-02 RX ADMIN — PROPOFOL 50 MG: 10 INJECTION, EMULSION INTRAVENOUS at 07:40

## 2023-05-02 RX ADMIN — LIDOCAINE HYDROCHLORIDE 60 MG: 20 INJECTION, SOLUTION EPIDURAL; INFILTRATION; INTRACAUDAL; PERINEURAL at 07:36

## 2023-05-02 RX ADMIN — PROPOFOL 50 MG: 10 INJECTION, EMULSION INTRAVENOUS at 08:00

## 2023-05-02 RX ADMIN — PROPOFOL 50 MG: 10 INJECTION, EMULSION INTRAVENOUS at 07:38

## 2023-05-02 RX ADMIN — PROPOFOL 100 MG: 10 INJECTION, EMULSION INTRAVENOUS at 07:36

## 2023-05-02 RX ADMIN — PROPOFOL 50 MG: 10 INJECTION, EMULSION INTRAVENOUS at 07:56

## 2023-05-02 RX ADMIN — PROPOFOL 50 MG: 10 INJECTION, EMULSION INTRAVENOUS at 08:07

## 2023-05-02 NOTE — H&P
[facility]    Patient Identification:  Name:  Zakia Novoa  Age:  57 y.o.  Sex:  female  :  1965  MRN:  [id number]   Visit Number:  [id number]  Primary Care Physician:  Krystyna Drake APRN       Chief complaint: Positive Cologuard    History of presenting illness:  57 y.o. female presents today for colonoscopy secondary to positive Cologuard.  Patient states her last colonoscopy was about 5 years ago.  At that time, 2 polyps were removed.  Currently, she denies bright red blood per rectum or melena.  She has not had no unusual weight loss.  She has had an episode of rectal bleeding and constipation.  She states this occurred prior to her Cologuard testing.  There is no family history of colon cancer.    ---------------------------------------------------------------------------------------------------------------------   Review of Systems   Constitutional: Negative for activity change, appetite change, chills, fatigue, fever and unexpected weight change.   HENT: Negative for mouth sores and trouble swallowing.    Eyes: Negative for photophobia, pain and redness.   Respiratory: Negative for cough and choking.    Cardiovascular: Negative for chest pain and leg swelling.   Gastrointestinal: Positive for constipation. Negative for abdominal distention, abdominal pain, anal bleeding, diarrhea, nausea, rectal pain and vomiting.   Endocrine: Negative for cold intolerance, heat intolerance and polyphagia.   Genitourinary: Negative for difficulty urinating and dysuria.   Musculoskeletal: Negative for arthralgias, joint swelling and myalgias.   Skin: Negative for rash and wound.   Allergic/Immunologic: Negative for environmental allergies and food allergies.   Neurological: Negative for dizziness and light-headedness.   Hematological: Negative for adenopathy. Does not bruise/bleed easily.   Psychiatric/Behavioral: Negative for sleep disturbance. The patient  is not nervous/anxious.       ---------------------------------------------------------------------------------------------------------------------   Past Medical History:   Diagnosis Date   • Anxiety    • Arthritis    • Diabetes mellitus    • Elevated cholesterol    • Hypertension      Past Surgical History:   Procedure Laterality Date   • COLONOSCOPY N/A 8/15/2017    Procedure: COLONOSCOPY FOR SCREENING;  Surgeon: Iain Wallace III, MD;  Location: [facility];  Service:    • VAGINAL DELIVERY  1982; 1983    2 daughters     Family History   Problem Relation Age of Onset   • Arthritis Mother    • Cancer Mother    • Diabetes Mother    • Hypertension Mother    • Cancer Father    • Hypertension Father    • Diabetes Maternal Aunt    • Diabetes Maternal Uncle    • Diabetes Paternal Aunt    • Diabetes Paternal Uncle    • Breast cancer Neg Hx      Social History     Socioeconomic History   • Marital status:      Spouse name: Krishan    • Number of children: 2   • Years of education: 7   Tobacco Use   • Smoking status: Former     Packs/day: 1.00     Years: 20.00     Pack years: 20.00     Types: Cigarettes     Start date: 8/29/2016   • Smokeless tobacco: Never   Vaping Use   • Vaping Use: Every day   • Substances: Nicotine, Flavoring   Substance and Sexual Activity   • Alcohol use: No   • Drug use: No   • Sexual activity: Defer     Partners: Male     Comment: Krishan Novoa     ---------------------------------------------------------------------------------------------------------------------   Allergies:  Patient has no known allergies.  ---------------------------------------------------------------------------------------------------------------------   Prior to Admission Medications     Prescriptions Last Dose Informant Patient Reported? Taking?    amLODIPine (NORVASC) 10 MG tablet 5/1/2023  Yes Yes    atenolol (TENORMIN) 50 MG tablet 5/1/2023 Self, Medication Bottle Yes Yes    Take 1 tablet by mouth Daily.     cetirizine (ZyrTEC) 10 MG tablet 5/1/2023  Yes Yes    EASY TOUCH PEN NEEDLES 31G X 5 MM misc 5/1/2023  Yes Yes    fluticasone (FLONASE) 50 MCG/ACT nasal spray 5/1/2023  Yes Yes    gabapentin (NEURONTIN) 300 MG capsule 5/1/2023  Yes Yes    800 mg 3 (Three) Times a Day.    hydroCHLOROthiazide (HYDRODIURIL) 25 MG tablet   Yes No    Take 1 tablet by mouth Daily.    Insulin Glargine (BASAGLAR KWIKPEN SC)   Yes No    Inject 60 Units under the skin into the appropriate area as directed Daily.    levothyroxine (SYNTHROID, LEVOTHROID) 50 MCG tablet   Yes No    Take 1 tablet by mouth Daily.    methocarbamol (ROBAXIN) 750 MG tablet   Yes No    Take 1 tablet by mouth 2 (Two) Times a Day As Needed.    polyethylene glycol (MIRALAX) 17 GM/SCOOP powder 5/1/2023  No Yes    Take 510 g by mouth Take As Directed. Place 15 cap fulls of powder in 32 oz of liquid of choice at 4:00 and 10:00 PM    pravastatin (PRAVACHOL) 40 MG tablet 5/1/2023  Yes Yes    sertraline (ZOLOFT) 50 MG tablet 5/1/2023  Yes Yes        Hospital Scheduled Meds:  lactated ringers, 125 mL/hr, Intravenous, Once  sodium chloride, 10 mL, Intravenous, Q12H         ---------------------------------------------------------------------------------------------------------------------   Vital Signs:  Temp:  [98.2 °F (36.8 °C)] 98.2 °F (36.8 °C)  Heart Rate:  [81] 81  Resp:  [18] 18  BP: (135)/(69) 135/69      05/02/23  0702   Weight: 73.9 kg (163 lb)     Body mass index is 27.98 kg/m².  ---------------------------------------------------------------------------------------------------------------------   Physical Exam:  Constitutional:  Well-developed and well-nourished.  No respiratory distress.      HENT:  Head: Normocephalic and atraumatic.  Mouth:  Moist mucous membranes.    Eyes:  Conjunctivae and EOM are normal.  Pupils are equal, round, and reactive to light.  No scleral icterus.  Neck:  Neck supple.  No JVD present.    Cardiovascular:  Normal rate, regular rhythm and  normal heart sounds with no murmur.  Pulmonary/Chest:  No respiratory distress, no wheezes, no crackles, with normal breath sounds and good air movement.  Abdominal:  Soft.  Bowel sounds are normal.  No distension and no tenderness.   Musculoskeletal:  No edema, no tenderness, and no deformity.  No red or swollen joints anywhere.    Neurological:  Alert and oriented to person, place, and time.  No cranial nerve deficit.  No tongue deviation.  No facial droop.  No slurred speech.   Skin:  Skin is warm and dry.  No rash noted.  No pallor.   Psychiatric:  Normal mood and affect.  Behavior is normal.  Judgment and thought content normal.   Peripheral vascular:  No edema and strong pulses on all 4 extremities.  Genitourinary:  ---------------------------------------------------------------------------------------------------------------------                Invalid input(s): PROTCrCl cannot be calculated (Patient's most recent lab result is older than the maximum 30 days allowed.).  No results found for: AMMONIA          Lab Results   Component Value Date    HGBA1C 8.33 (H) 02/18/2020     Lab Results   Component Value Date    TSH 3.520 02/18/2020     No results found for: PREGTESTUR, PREGSERUM, HCG, HCGQUANT  Pain Management Panel          View : No data to display.                    No results found for: BLOODCX  No results found for: URINECX  No results found for: WOUNDCX  No results found for: STOOLCX      ---------------------------------------------------------------------------------------------------------------------  Imaging Results (Last 7 Days)     ** No results found for the last 168 hours. **          I have personally reviewed the radiology images and read the final radiology report.  ---------------------------------------------------------------------------------------------------------------------  Assessment and Plan:  Proceed with colonoscopy secondary to positive Cologuard and personal history of  colon polyps.    Bhavna Maxwell MD  05/02/23  07:25 EDT

## 2023-05-02 NOTE — ANESTHESIA PREPROCEDURE EVALUATION
Anesthesia Evaluation     Patient summary reviewed and Nursing notes reviewed   no history of anesthetic complications:  NPO Solid Status: > 8 hours  NPO Liquid Status: > 8 hours           Airway   Mallampati: II  TM distance: >3 FB  Neck ROM: full  no difficulty expected  Dental - normal exam     Pulmonary - normal exam   (+) a smoker Former,   Cardiovascular - normal exam    (+) hypertension poorly controlled 2 medications or greater, hyperlipidemia,       Neuro/Psych  (+) psychiatric history Depression,    GI/Hepatic/Renal/Endo    (+)   diabetes mellitus type 2 using insulin, thyroid problem hypothyroidism    Musculoskeletal     Abdominal    Substance History      OB/GYN negative ob/gyn ROS         Other   arthritis,                        Anesthesia Plan    ASA 2     general     intravenous induction     Anesthetic plan, risks, benefits, and alternatives have been provided, discussed and informed consent has been obtained with: patient.  Pre-procedure education provided  Use of blood products discussed with patient  Consented to blood products.       Lab Results   Component Value Date    WBC 6.22 02/18/2020    HGB 14.8 02/18/2020    HCT 44.5 02/18/2020    MCV 92.3 02/18/2020     02/18/2020       Lab Results   Component Value Date    GLUCOSE 85 02/18/2020    BUN 11 02/18/2020    CREATININE 0.65 02/18/2020    BCR 16.9 02/18/2020    K 4.0 02/18/2020    CO2 22.8 02/18/2020    CALCIUM 10.5 02/18/2020    ALBUMIN 4.80 02/18/2020    BILITOT 0.4 02/18/2020    AST 23 02/18/2020    ALT 19 02/18/2020

## 2023-05-04 LAB — REF LAB TEST METHOD: NORMAL

## 2023-05-04 NOTE — PROGRESS NOTES
At the time of your recent colonoscopy, four polyps were removed from your colon.  Most of the polyps were tubular adenomas which are considered precancerous.  A smaller polyp was hyperplastic which has no cancer potential.  We will repeat colonoscopy in 6 months to recheck for complete removal of the larger polyp.

## 2023-05-08 ENCOUNTER — TELEPHONE (OUTPATIENT)
Dept: GASTROENTEROLOGY | Facility: CLINIC | Age: 58
End: 2023-05-08
Payer: COMMERCIAL

## 2023-05-09 NOTE — TELEPHONE ENCOUNTER
Please let the patient know that her polyps were tubular adenomas which are considered precancerous.  Because of the larger polyp found in the colon, she will need repeat colonoscopy in 6 months to check for complete removal.  Please place her on colonoscopy recall list for 6 months.

## 2023-10-16 NOTE — TELEPHONE ENCOUNTER
Spoke with patient and told her her results and she is on a 6 month recall list to repeat colonoscopy   12

## 2023-10-30 DIAGNOSIS — R19.5 POSITIVE COLORECTAL CANCER SCREENING USING COLOGUARD TEST: Primary | ICD-10-CM

## 2023-10-30 RX ORDER — BISACODYL 5 MG/1
20 TABLET, DELAYED RELEASE ORAL ONCE
Qty: 4 TABLET | Refills: 0 | Status: SHIPPED | OUTPATIENT
Start: 2023-10-30 | End: 2023-10-30

## 2023-10-30 RX ORDER — POLYETHYLENE GLYCOL 3350 17 G/17G
510 POWDER, FOR SOLUTION ORAL ONCE
Qty: 510 G | Refills: 0 | Status: SHIPPED | OUTPATIENT
Start: 2023-10-30 | End: 2023-10-30

## 2024-01-10 ENCOUNTER — ANESTHESIA (OUTPATIENT)
Dept: PERIOP | Facility: HOSPITAL | Age: 59
End: 2024-01-10
Payer: COMMERCIAL

## 2024-01-10 ENCOUNTER — ANESTHESIA EVENT (OUTPATIENT)
Dept: PERIOP | Facility: HOSPITAL | Age: 59
End: 2024-01-10
Payer: COMMERCIAL

## 2024-01-10 ENCOUNTER — HOSPITAL ENCOUNTER (OUTPATIENT)
Facility: HOSPITAL | Age: 59
Setting detail: HOSPITAL OUTPATIENT SURGERY
Discharge: HOME | End: 2024-01-10
Attending: INTERNAL MEDICINE | Admitting: INTERNAL MEDICINE
Payer: COMMERCIAL

## 2024-01-10 VITALS
OXYGEN SATURATION: 96 % | RESPIRATION RATE: 18 BRPM | SYSTOLIC BLOOD PRESSURE: 115 MMHG | HEIGHT: 64 IN | BODY MASS INDEX: 28 KG/M2 | DIASTOLIC BLOOD PRESSURE: 70 MMHG | HEART RATE: 67 BPM | TEMPERATURE: 97 F | WEIGHT: 164 LBS

## 2024-01-10 DIAGNOSIS — R19.5 POSITIVE COLORECTAL CANCER SCREENING USING COLOGUARD TEST: ICD-10-CM

## 2024-01-10 LAB — GLUCOSE BLDC GLUCOMTR-MCNC: 120 MG/DL (ref 70–130)

## 2024-01-10 PROCEDURE — 25010000002 PROPOFOL 200 MG/20ML EMULSION: Performed by: NURSE ANESTHETIST, CERTIFIED REGISTERED

## 2024-01-10 PROCEDURE — 25010000002 MIDAZOLAM PER 1 MG: Performed by: NURSE ANESTHETIST, CERTIFIED REGISTERED

## 2024-01-10 PROCEDURE — 45385 COLONOSCOPY W/LESION REMOVAL: CPT | Performed by: INTERNAL MEDICINE

## 2024-01-10 PROCEDURE — 25810000003 LACTATED RINGERS PER 1000 ML: Performed by: ANESTHESIOLOGY

## 2024-01-10 PROCEDURE — 82948 REAGENT STRIP/BLOOD GLUCOSE: CPT

## 2024-01-10 RX ORDER — MIDAZOLAM HYDROCHLORIDE 1 MG/ML
1 INJECTION INTRAMUSCULAR; INTRAVENOUS
Status: DISCONTINUED | OUTPATIENT
Start: 2024-01-10 | End: 2024-01-10 | Stop reason: HOSPADM

## 2024-01-10 RX ORDER — MEPERIDINE HYDROCHLORIDE 25 MG/ML
12.5 INJECTION INTRAMUSCULAR; INTRAVENOUS; SUBCUTANEOUS
Status: DISCONTINUED | OUTPATIENT
Start: 2024-01-10 | End: 2024-01-10 | Stop reason: HOSPADM

## 2024-01-10 RX ORDER — SODIUM CHLORIDE 9 MG/ML
40 INJECTION, SOLUTION INTRAVENOUS AS NEEDED
Status: DISCONTINUED | OUTPATIENT
Start: 2024-01-10 | End: 2024-01-10 | Stop reason: HOSPADM

## 2024-01-10 RX ORDER — MIDAZOLAM HYDROCHLORIDE 1 MG/ML
INJECTION INTRAMUSCULAR; INTRAVENOUS AS NEEDED
Status: DISCONTINUED | OUTPATIENT
Start: 2024-01-10 | End: 2024-01-10 | Stop reason: SURG

## 2024-01-10 RX ORDER — SODIUM CHLORIDE 0.9 % (FLUSH) 0.9 %
10 SYRINGE (ML) INJECTION AS NEEDED
Status: DISCONTINUED | OUTPATIENT
Start: 2024-01-10 | End: 2024-01-10 | Stop reason: HOSPADM

## 2024-01-10 RX ORDER — SODIUM CHLORIDE, SODIUM LACTATE, POTASSIUM CHLORIDE, CALCIUM CHLORIDE 600; 310; 30; 20 MG/100ML; MG/100ML; MG/100ML; MG/100ML
125 INJECTION, SOLUTION INTRAVENOUS ONCE
Status: COMPLETED | OUTPATIENT
Start: 2024-01-10 | End: 2024-01-10

## 2024-01-10 RX ORDER — SODIUM CHLORIDE, SODIUM LACTATE, POTASSIUM CHLORIDE, CALCIUM CHLORIDE 600; 310; 30; 20 MG/100ML; MG/100ML; MG/100ML; MG/100ML
100 INJECTION, SOLUTION INTRAVENOUS ONCE AS NEEDED
Status: DISCONTINUED | OUTPATIENT
Start: 2024-01-10 | End: 2024-01-10 | Stop reason: HOSPADM

## 2024-01-10 RX ORDER — KETOROLAC TROMETHAMINE 30 MG/ML
30 INJECTION, SOLUTION INTRAMUSCULAR; INTRAVENOUS EVERY 6 HOURS PRN
Status: DISCONTINUED | OUTPATIENT
Start: 2024-01-10 | End: 2024-01-10 | Stop reason: HOSPADM

## 2024-01-10 RX ORDER — IPRATROPIUM BROMIDE AND ALBUTEROL SULFATE 2.5; .5 MG/3ML; MG/3ML
3 SOLUTION RESPIRATORY (INHALATION) ONCE AS NEEDED
Status: DISCONTINUED | OUTPATIENT
Start: 2024-01-10 | End: 2024-01-10 | Stop reason: HOSPADM

## 2024-01-10 RX ORDER — OXYCODONE HYDROCHLORIDE AND ACETAMINOPHEN 5; 325 MG/1; MG/1
1 TABLET ORAL ONCE AS NEEDED
Status: DISCONTINUED | OUTPATIENT
Start: 2024-01-10 | End: 2024-01-10 | Stop reason: HOSPADM

## 2024-01-10 RX ORDER — ONDANSETRON 2 MG/ML
4 INJECTION INTRAMUSCULAR; INTRAVENOUS AS NEEDED
Status: DISCONTINUED | OUTPATIENT
Start: 2024-01-10 | End: 2024-01-10 | Stop reason: HOSPADM

## 2024-01-10 RX ORDER — PROPOFOL 10 MG/ML
INJECTION, EMULSION INTRAVENOUS AS NEEDED
Status: DISCONTINUED | OUTPATIENT
Start: 2024-01-10 | End: 2024-01-10 | Stop reason: SURG

## 2024-01-10 RX ORDER — SODIUM CHLORIDE 0.9 % (FLUSH) 0.9 %
10 SYRINGE (ML) INJECTION EVERY 12 HOURS SCHEDULED
Status: DISCONTINUED | OUTPATIENT
Start: 2024-01-10 | End: 2024-01-10 | Stop reason: HOSPADM

## 2024-01-10 RX ORDER — FENTANYL CITRATE 50 UG/ML
50 INJECTION, SOLUTION INTRAMUSCULAR; INTRAVENOUS
Status: DISCONTINUED | OUTPATIENT
Start: 2024-01-10 | End: 2024-01-10 | Stop reason: HOSPADM

## 2024-01-10 RX ADMIN — PROPOFOL 50 MG: 10 INJECTION, EMULSION INTRAVENOUS at 09:28

## 2024-01-10 RX ADMIN — PROPOFOL 100 MG: 10 INJECTION, EMULSION INTRAVENOUS at 08:53

## 2024-01-10 RX ADMIN — MIDAZOLAM HYDROCHLORIDE 2 MG: 1 INJECTION, SOLUTION INTRAMUSCULAR; INTRAVENOUS at 08:51

## 2024-01-10 RX ADMIN — PROPOFOL 50 MG: 10 INJECTION, EMULSION INTRAVENOUS at 09:08

## 2024-01-10 RX ADMIN — PROPOFOL 50 MG: 10 INJECTION, EMULSION INTRAVENOUS at 09:18

## 2024-01-10 RX ADMIN — PROPOFOL 80 MG: 10 INJECTION, EMULSION INTRAVENOUS at 09:03

## 2024-01-10 RX ADMIN — PROPOFOL 50 MG: 10 INJECTION, EMULSION INTRAVENOUS at 09:33

## 2024-01-10 RX ADMIN — PROPOFOL 50 MG: 10 INJECTION, EMULSION INTRAVENOUS at 09:13

## 2024-01-10 RX ADMIN — PROPOFOL 50 MG: 10 INJECTION, EMULSION INTRAVENOUS at 09:23

## 2024-01-10 RX ADMIN — PROPOFOL 80 MG: 10 INJECTION, EMULSION INTRAVENOUS at 08:58

## 2024-01-10 RX ADMIN — SODIUM CHLORIDE, POTASSIUM CHLORIDE, SODIUM LACTATE AND CALCIUM CHLORIDE: 600; 310; 30; 20 INJECTION, SOLUTION INTRAVENOUS at 08:51

## 2024-01-10 NOTE — ANESTHESIA PREPROCEDURE EVALUATION
Anesthesia Evaluation     Patient summary reviewed and Nursing notes reviewed   no history of anesthetic complications:   NPO Solid Status: > 8 hours  NPO Liquid Status: > 8 hours           Airway   Mallampati: II  TM distance: >3 FB  Neck ROM: full  No difficulty expected  Dental    (+) upper dentures    Pulmonary - normal exam    breath sounds clear to auscultation  (+) a smoker Current, Smoked day of surgery, cigarettes,  Cardiovascular - normal exam    Patient on routine beta blocker and Beta blocker given within 24 hours of surgery  Rhythm: regular  Rate: normal    (+) hypertension poorly controlled 2 medications or greater, hyperlipidemia      Neuro/Psych  (+) psychiatric history Anxiety  GI/Hepatic/Renal/Endo    (+) diabetes mellitus type 2 using insulin, thyroid problem hypothyroidism    Musculoskeletal     Abdominal  - normal exam   Substance History      OB/GYN negative ob/gyn ROS         Other   arthritis,                   Anesthesia Plan    ASA 2     general   total IV anesthesia  intravenous induction     Anesthetic plan, risks, benefits, and alternatives have been provided, discussed and informed consent has been obtained with: patient.  Pre-procedure education provided  Use of blood products discussed with patient  Consented to blood products.

## 2024-01-10 NOTE — H&P
[facility]    Patient Identification:  Name:  Zakia Novoa  Age:  58 y.o.  Sex:  female  :  1965  MRN:  [id number]   Visit Number:  [id number]  Primary Care Physician:  Krystyna Drake APRN       Chief complaint: Check polypectomy site and personal history of colon polyps    History of presenting illness:  58 y.o. female presents today for colonoscopy to recheck the polypectomy site.  In May of last year, the patient had a large 25 cm flat polyp removed from the ileocecal valve area.  I will recheck this site today.  The patient denies family history of colon cancer.  She has not had unusual weight loss.  She underwent colonoscopy last year secondary to a positive Cologuard.  The patient denies bright red blood per rectum or melena.  She has not had unusual weight loss.  She denies abdominal pain.    ---------------------------------------------------------------------------------------------------------------------   Review of Systems   Constitutional:  Negative for activity change, appetite change, chills, fatigue, fever and unexpected weight change.   HENT:  Negative for mouth sores and trouble swallowing.    Eyes:  Negative for photophobia, pain and redness.   Respiratory:  Negative for cough and choking.    Cardiovascular:  Negative for chest pain and leg swelling.   Gastrointestinal:  Negative for abdominal distention, abdominal pain, anal bleeding, constipation, diarrhea, nausea, rectal pain and vomiting.   Endocrine: Negative for cold intolerance, heat intolerance and polyphagia.   Genitourinary:  Negative for difficulty urinating and dysuria.   Musculoskeletal:  Negative for arthralgias, joint swelling and myalgias.   Skin:  Negative for rash and wound.   Allergic/Immunologic: Negative for environmental allergies and food allergies.   Neurological:  Negative for dizziness and light-headedness.   Hematological:  Negative for adenopathy. Does  not bruise/bleed easily.   Psychiatric/Behavioral:  Negative for sleep disturbance. The patient is not nervous/anxious.       ---------------------------------------------------------------------------------------------------------------------   Past Medical History:   Diagnosis Date    Anxiety     Arthritis     Diabetes mellitus     Elevated cholesterol     Hypertension      Past Surgical History:   Procedure Laterality Date    COLONOSCOPY N/A 8/15/2017    Procedure: COLONOSCOPY FOR SCREENING;  Surgeon: Iain Wallace III, MD;  Location: [facility];  Service:     COLONOSCOPY N/A 5/2/2023    Procedure: COLONOSCOPY;  Surgeon: Bhavna Maxwell MD;  Location: [facility] OR;  Service: Gastroenterology;  Laterality: N/A;  TATTOOED ILEOCECAL VALVE POLYP    VAGINAL DELIVERY  1982; 1983    2 daughters     Family History   Problem Relation Age of Onset    Arthritis Mother     Cancer Mother     Diabetes Mother     Hypertension Mother     Cancer Father     Hypertension Father     Diabetes Maternal Aunt     Diabetes Maternal Uncle     Diabetes Paternal Aunt     Diabetes Paternal Uncle     Breast cancer Neg Hx      Social History     Socioeconomic History    Marital status:      Spouse name: Krishan     Number of children: 2    Years of education: 7   Tobacco Use    Smoking status: Former     Packs/day: 1.00     Years: 20.00     Additional pack years: 0.00     Total pack years: 20.00     Types: Cigarettes     Start date: 8/29/2016    Smokeless tobacco: Never   Vaping Use    Vaping Use: Some days    Substances: Nicotine, Flavoring   Substance and Sexual Activity    Alcohol use: No    Drug use: No    Sexual activity: Defer     Partners: Male     Comment: Krishan Novoa     ---------------------------------------------------------------------------------------------------------------------   Allergies:  Patient has no known  allergies.  ---------------------------------------------------------------------------------------------------------------------   Prior to Admission Medications       Prescriptions Last Dose Informant Patient Reported? Taking?    amLODIPine (NORVASC) 10 MG tablet 1/10/2024  Yes Yes    Take 1 tablet by mouth Daily.    atenolol (TENORMIN) 50 MG tablet 1/10/2024 Self, Medication Bottle Yes Yes    Take 1 tablet by mouth Daily.    cetirizine (ZyrTEC) 10 MG tablet 1/9/2024  Yes Yes    EASY TOUCH PEN NEEDLES 31G X 5 MM misc 1/9/2024  Yes Yes    fluticasone (FLONASE) 50 MCG/ACT nasal spray 1/9/2024  Yes Yes    gabapentin (NEURONTIN) 300 MG capsule 1/9/2024  Yes Yes    800 mg 3 (Three) Times a Day.    hydroCHLOROthiazide (HYDRODIURIL) 25 MG tablet 1/9/2024  Yes Yes    Take 1 tablet by mouth Daily.    Insulin Glargine (BASAGLAR KWIKPEN SC) Past Week  Yes Yes    Inject 60 Units under the skin into the appropriate area as directed Daily.    levothyroxine (SYNTHROID, LEVOTHROID) 50 MCG tablet 1/9/2024  Yes Yes    Take 1 tablet by mouth Daily.    methocarbamol (ROBAXIN) 750 MG tablet 1/9/2024  Yes Yes    Take 1 tablet by mouth 2 (Two) Times a Day As Needed.    pravastatin (PRAVACHOL) 40 MG tablet 1/9/2024  Yes Yes    sertraline (ZOLOFT) 50 MG tablet 1/9/2024  Yes Yes          Hospital Scheduled Meds:  lactated ringers, 125 mL/hr, Intravenous, Once  sodium chloride, 10 mL, Intravenous, Q12H         ---------------------------------------------------------------------------------------------------------------------   Vital Signs:  Temp:  [98.3 °F (36.8 °C)] 98.3 °F (36.8 °C)  Heart Rate:  [71] 71  Resp:  [18] 18  BP: (123)/(94) 123/94      01/10/24  0748   Weight: 74.4 kg (164 lb)     Body mass index is 28.15 kg/m².  ---------------------------------------------------------------------------------------------------------------------   Physical Exam:  Constitutional:  Well-developed and well-nourished.  No respiratory distress.     "  HENT:  Head: Normocephalic and atraumatic.  Mouth:  Moist mucous membranes.    Eyes:  Conjunctivae and EOM are normal.  Pupils are equal, round, and reactive to light.  No scleral icterus.  Neck:  Neck supple.  No JVD present.    Cardiovascular:  Normal rate, regular rhythm and normal heart sounds with no murmur.  Pulmonary/Chest:  No respiratory distress, no wheezes, no crackles, with normal breath sounds and good air movement.  Abdominal:  Soft.  Bowel sounds are normal.  No distension and no tenderness.   Musculoskeletal:  No edema, no tenderness, and no deformity.  No red or swollen joints anywhere.    Neurological:  Alert and oriented to person, place, and time.  No cranial nerve deficit.  No tongue deviation.  No facial droop.  No slurred speech.   Skin:  Skin is warm and dry.  No rash noted.  No pallor.   Psychiatric:  Normal mood and affect.  Behavior is normal.  Judgment and thought content normal.   Peripheral vascular:  No edema and strong pulses on all 4 extremities.  Genitourinary:  ---------------------------------------------------------------------------------------------------------------------        Invalid input(s): \"PROT\"CrCl cannot be calculated (Patient's most recent lab result is older than the maximum 30 days allowed.).  No results found for: \"AMMONIA\"          Lab Results   Component Value Date    HGBA1C 8.33 (H) 02/18/2020     Lab Results   Component Value Date    TSH 3.520 02/18/2020     No results found for: \"PREGTESTUR\", \"PREGSERUM\", \"HCG\", \"HCGQUANT\"  Pain Management Panel           No data to display              No results found for: \"BLOODCX\"  No results found for: \"URINECX\"  No results found for: \"WOUNDCX\"  No results found for: \"STOOLCX\"      ---------------------------------------------------------------------------------------------------------------------  Imaging Results (Last 7 Days)       ** No results found for the last 168 hours. **            I have personally reviewed " the radiology images and read the final radiology report.  ---------------------------------------------------------------------------------------------------------------------  Assessment and Plan:  With colonoscopy to check polypectomy site at the ileocecal valve.  Will also remove any additional polyps found.    Bhavna Maxwell MD  01/10/24  08:09 EST

## 2024-01-10 NOTE — ANESTHESIA POSTPROCEDURE EVALUATION
Patient: Zakia Novoa    Procedure Summary       Date: 01/10/24 Room / Location: [facility] OR [address] [place] [address] COR OR    Anesthesia Start: 0851 Anesthesia Stop: 0939    Procedure: COLONOSCOPY Diagnosis:       Positive colorectal cancer screening using Cologuard test      (Positive colorectal cancer screening using Cologuard test [R19.5])    Surgeons: Bhavna Maxwell MD Provider: Brayden Weller MD    Anesthesia Type: general ASA Status: 2            Anesthesia Type: general    Vitals  Vitals Value Taken Time   /75 01/10/24 0955   Temp 97 °F (36.1 °C) 01/10/24 0941   Pulse 66 01/10/24 1001   Resp 18 01/10/24 0955   SpO2 95 % 01/10/24 1001   Vitals shown include unfiled device data.        Post Anesthesia Care and Evaluation    Patient location during evaluation: bedside  Patient participation: complete - patient participated  Level of consciousness: awake and alert  Pain score: 1  Pain management: adequate    Airway patency: patent  Anesthetic complications: No anesthetic complications  PONV Status: none  Cardiovascular status: acceptable  Respiratory status: acceptable  Hydration status: acceptable

## 2024-01-15 LAB — REF LAB TEST METHOD: NORMAL

## 2024-01-17 NOTE — PROGRESS NOTES
At the time of your recent colonoscopy, multiple polyps were removed from the colon.  All of the polyps were either tubular adenomas or sessile serrated lesions.  These are considered precancerous.  Due to the number of polyps found during this colonoscopy, you will need repeat colonoscopy in 1 year.  Thank you for allow me to participate in your care.

## 2024-02-02 ENCOUNTER — OFFICE VISIT (OUTPATIENT)
Dept: SURGERY | Facility: CLINIC | Age: 59
End: 2024-02-02
Payer: COMMERCIAL

## 2024-02-02 VITALS
WEIGHT: 163 LBS | BODY MASS INDEX: 27.83 KG/M2 | SYSTOLIC BLOOD PRESSURE: 122 MMHG | DIASTOLIC BLOOD PRESSURE: 91 MMHG | HEIGHT: 64 IN

## 2024-02-02 DIAGNOSIS — R22.32 NODULE OF SKIN OF LEFT FOREARM: Primary | ICD-10-CM

## 2024-02-02 DIAGNOSIS — L72.3 SEBACEOUS CYST: ICD-10-CM

## 2024-02-02 DIAGNOSIS — L72.3 SEBACEOUS CYST: Primary | ICD-10-CM

## 2024-02-02 NOTE — PROGRESS NOTES
Subjective   Zakia Novoa is a 58 y.o. female who presents today for Initial Evaluation    Chief Complaint:    Chief Complaint   Patient presents with    Mass        History of Present Illness:    History of Present Illness Zakia is a 58-year-old female who presents with concerns of a skin nodule to her left posterior forearm.  Patient reports has been there at least 6 months.  She reports that it originally started as what she believed to be a bug bite.  However it never healed.  She reports that it originally itched.  States that it started as a small area but then the nodule increased in.  She reports that she has had multiple skin nodules removed off her leg in the past and was concerned.    The following portions of the patient's history were reviewed and updated as appropriate: allergies, current medications, past family history, past medical history, past social history, past surgical history and problem list.    Past Medical History:  Past Medical History:   Diagnosis Date    Anxiety     Arthritis     Diabetes mellitus     Elevated cholesterol     Hypertension        Social History:  Social History     Socioeconomic History    Marital status:      Spouse name: Krishan     Number of children: 2    Years of education: 7   Tobacco Use    Smoking status: Former     Packs/day: 1.00     Years: 20.00     Additional pack years: 0.00     Total pack years: 20.00     Types: Cigarettes     Start date: 8/29/2016    Smokeless tobacco: Never   Vaping Use    Vaping Use: Some days    Substances: Nicotine, Flavoring   Substance and Sexual Activity    Alcohol use: No    Drug use: No    Sexual activity: Defer     Partners: Male     Comment: Krishan Novoa       Family History:  Family History   Problem Relation Age of Onset    Arthritis Mother     Cancer Mother     Diabetes Mother     Hypertension Mother     Cancer Father     Hypertension Father     Diabetes Maternal Aunt     Diabetes Maternal Uncle     Diabetes Paternal  Aunt     Diabetes Paternal Uncle     Breast cancer Neg Hx        Past Surgical History:  Past Surgical History:   Procedure Laterality Date    COLONOSCOPY N/A 8/15/2017    Procedure: COLONOSCOPY FOR SCREENING;  Surgeon: Iain Wallace III, MD;  Location:  [facility] OR;  Service:     COLONOSCOPY N/A 5/2/2023    Procedure: COLONOSCOPY;  Surgeon: Bhavna Maxwell MD;  Location:  [facility] OR;  Service: Gastroenterology;  Laterality: N/A;  TATTOOED ILEOCECAL VALVE POLYP    COLONOSCOPY N/A 1/10/2024    Procedure: COLONOSCOPY;  Surgeon: Bhavna Maxwell MD;  Location:  [facility] OR;  Service: Gastroenterology;  Laterality: N/A;    VAGINAL DELIVERY  1982; 1983    2 daughters       Problem List:  Patient Active Problem List   Diagnosis    Visit for wound check    Degenerative disc disease, lumbar    Positive colorectal cancer screening using Cologuard test    Encounter for screening for malignant neoplasm of colon       Allergy:   No Known Allergies     Current Medications:   Current Outpatient Medications   Medication Sig Dispense Refill    amLODIPine (NORVASC) 10 MG tablet Take 1 tablet by mouth Daily.  5    atenolol (TENORMIN) 50 MG tablet Take 1 tablet by mouth Daily.      cetirizine (ZyrTEC) 10 MG tablet   5    EASY TOUCH PEN NEEDLES 31G X 5 MM misc   5    fluticasone (FLONASE) 50 MCG/ACT nasal spray   2    gabapentin (NEURONTIN) 300 MG capsule 800 mg 3 (Three) Times a Day.  5    hydroCHLOROthiazide (HYDRODIURIL) 25 MG tablet Take 1 tablet by mouth Daily.      Insulin Glargine (BASAGLAR KWIKPEN SC) Inject 60 Units under the skin into the appropriate area as directed Daily.      levothyroxine (SYNTHROID, LEVOTHROID) 50 MCG tablet Take 1 tablet by mouth Daily.      methocarbamol (ROBAXIN) 750 MG tablet Take 1 tablet by mouth 2 (Two) Times a Day As Needed.      pravastatin (PRAVACHOL) 40 MG tablet   5    sertraline (ZOLOFT) 50 MG tablet   5     No current facility-administered medications for this visit.  "      Review of Systems:    Review of Systems   Skin:         Positive for skin nodule         Physical Exam:   Physical Exam  Constitutional:       Appearance: Normal appearance.   HENT:      Head: Normocephalic and atraumatic.      Right Ear: External ear normal.      Left Ear: External ear normal.   Eyes:      Conjunctiva/sclera: Conjunctivae normal.      Pupils: Pupils are equal, round, and reactive to light.   Cardiovascular:      Pulses: Normal pulses.   Pulmonary:      Effort: Pulmonary effort is normal.   Abdominal:      General: Abdomen is flat.      Palpations: Abdomen is soft.   Musculoskeletal:         General: Normal range of motion.      Cervical back: Normal range of motion and neck supple.   Skin:     General: Skin is warm and dry.      Capillary Refill: Capillary refill takes less than 2 seconds.      Comments: There is an approximately 0.5 x 0.5 cm skin nodule to left posterior forearm.  No redness or open areas.  It is firm to palpation   Neurological:      General: No focal deficit present.      Mental Status: She is alert and oriented to person, place, and time.   Psychiatric:         Mood and Affect: Mood normal.         Behavior: Behavior normal.         Vitals:  Blood pressure 122/91, height 162.6 cm (64.02\"), weight 73.9 kg (163 lb).   Body mass index is 27.96 kg/m².     Procedure: Informed consent was obtained.  Area was cleansed with chlorhexidine.  Lidocaine was used as local anesthesia.  A 3 mm punch biopsy was obtained of skin lesion.  A 3-0 chromic suture was used in a figure-of-eight fashion to close area.  Area was then cleansed and covered with a bandage.  Minimal blood loss noted.  No complication noted.  Patient tolerated well.      Assessment & Plan   Diagnoses and all orders for this visit:    1. Nodule of skin of left forearm (Primary)  -     Skin / Nail Biopsy    Zakia is a 58-year-old female who presents with a nodule in the skin of the left forearm.  A 3 mm punch biopsy was " obtained today.  This will be tracked.  I will call patient with results and we will schedule her another appointment if it is necessary that she returned, she preferred to be called versus scheduling a follow-up appointment.    Visit Diagnoses:    ICD-10-CM ICD-9-CM   1. Nodule of skin of left forearm  R22.32 782.2         MEDS ORDERED DURING VISIT:  No orders of the defined types were placed in this encounter.      Return if symptoms worsen or fail to improve.             This document has been electronically signed by CURTIS Demarco  February 2, 2024 13:46 EST    Please note that portions of this note were completed with a voice recognition program.

## 2024-02-06 LAB — REF LAB TEST METHOD: NORMAL

## 2024-02-06 NOTE — PROGRESS NOTES
Received pathology from punch biopsy.  I called and discussed with Zakia that pathology revealed benign epidermal cyst.  She verbalized understanding.  Reports that chromic suture is still in place.  Patient will call if suture has any issues dissolving.  Denies any needs or concerns.  Will follow-up as needed.

## 2024-02-21 ENCOUNTER — TRANSCRIBE ORDERS (OUTPATIENT)
Dept: ADMINISTRATIVE | Facility: HOSPITAL | Age: 59
End: 2024-02-21
Payer: COMMERCIAL

## 2024-02-21 DIAGNOSIS — Z12.31 VISIT FOR SCREENING MAMMOGRAM: Primary | ICD-10-CM

## 2024-04-01 ENCOUNTER — HOSPITAL ENCOUNTER (OUTPATIENT)
Dept: MAMMOGRAPHY | Facility: HOSPITAL | Age: 59
Discharge: HOME | End: 2024-04-01
Admitting: NURSE PRACTITIONER
Payer: COMMERCIAL

## 2024-04-01 DIAGNOSIS — Z12.31 VISIT FOR SCREENING MAMMOGRAM: ICD-10-CM

## 2024-04-01 PROCEDURE — 77067 SCR MAMMO BI INCL CAD: CPT | Performed by: RADIOLOGY

## 2024-04-01 PROCEDURE — 77063 BREAST TOMOSYNTHESIS BI: CPT

## 2024-04-01 PROCEDURE — 77063 BREAST TOMOSYNTHESIS BI: CPT | Performed by: RADIOLOGY

## 2024-04-01 PROCEDURE — 77067 SCR MAMMO BI INCL CAD: CPT

## 2024-04-23 ENCOUNTER — TRANSCRIBE ORDERS (OUTPATIENT)
Dept: ADMINISTRATIVE | Facility: HOSPITAL | Age: 59
End: 2024-04-23
Payer: COMMERCIAL

## 2024-04-23 DIAGNOSIS — F17.210 CIGARETTE SMOKER: Primary | ICD-10-CM

## 2024-07-03 ENCOUNTER — HOSPITAL ENCOUNTER (OUTPATIENT)
Dept: CT IMAGING | Facility: HOSPITAL | Age: 59
Discharge: HOME | End: 2024-07-03
Admitting: NURSE PRACTITIONER
Payer: COMMERCIAL

## 2024-07-03 DIAGNOSIS — F17.210 CIGARETTE SMOKER: ICD-10-CM

## 2024-07-03 PROCEDURE — 71271 CT THORAX LUNG CANCER SCR C-: CPT

## 2024-11-05 ENCOUNTER — HOSPITAL ENCOUNTER (OUTPATIENT)
Dept: GENERAL RADIOLOGY | Facility: HOSPITAL | Age: 59
Discharge: HOME | End: 2024-11-05
Admitting: NURSE PRACTITIONER
Payer: COMMERCIAL

## 2024-11-05 ENCOUNTER — TRANSCRIBE ORDERS (OUTPATIENT)
Dept: ADMINISTRATIVE | Facility: HOSPITAL | Age: 59
End: 2024-11-05
Payer: COMMERCIAL

## 2024-11-05 DIAGNOSIS — M65.312 TRIGGER THUMB OF LEFT HAND: ICD-10-CM

## 2024-11-05 DIAGNOSIS — M65.312 TRIGGER THUMB OF LEFT HAND: Primary | ICD-10-CM

## 2024-11-05 PROCEDURE — 73140 X-RAY EXAM OF FINGER(S): CPT | Performed by: RADIOLOGY

## 2024-11-05 PROCEDURE — 73140 X-RAY EXAM OF FINGER(S): CPT

## 2024-11-19 ENCOUNTER — OFFICE VISIT (OUTPATIENT)
Dept: ORTHOPEDIC SURGERY | Facility: CLINIC | Age: 59
End: 2024-11-19
Payer: COMMERCIAL

## 2024-11-19 VITALS
WEIGHT: 163 LBS | HEART RATE: 76 BPM | BODY MASS INDEX: 27.83 KG/M2 | DIASTOLIC BLOOD PRESSURE: 79 MMHG | SYSTOLIC BLOOD PRESSURE: 126 MMHG | HEIGHT: 64 IN

## 2024-11-19 DIAGNOSIS — M65.312 TRIGGER THUMB OF LEFT HAND: Primary | ICD-10-CM

## 2024-11-19 PROCEDURE — 99203 OFFICE O/P NEW LOW 30 MIN: CPT | Performed by: PHYSICIAN ASSISTANT

## 2024-11-19 RX ORDER — NAPROXEN 500 MG/1
500 TABLET ORAL 2 TIMES DAILY WITH MEALS
Qty: 60 TABLET | Refills: 2 | Status: SHIPPED | OUTPATIENT
Start: 2024-11-19

## 2024-11-19 NOTE — PROGRESS NOTES
[facility] [place] [facility] [place]          Patient: Zakia Novoa  YOB: 1965  Date of Encounter: 11/19/2024  PCP: Krystyna Drake APRN      Subjective     Chief Complaint   Patient presents with    Left Hand - Initial Evaluation, Pain           History of Present Illness:     Zakia Novoa is a 59 y.o. female presents today as result of left thumb pain and stiffness several weeks duration.  The patient states that she was diagnosed with trigger thumb approximately 10 to 12 years ago with resolution.  Patient reports dull throbbing aching pain to the base of the thumb with difficulty upon attempted full flexion.  Patient denies any paresthesias.  She reports that this began catching and locking however now she is not able to fully flex her thumb.  She has been implementing occasional and intermittent topical NSAIDs such therapeutically and has been using a brace intermittently as well.  No other new complaints.  Denies any paresthesias        Patient Active Problem List   Diagnosis    Visit for wound check    Degenerative disc disease, lumbar    Positive colorectal cancer screening using Cologuard test    Encounter for screening for malignant neoplasm of colon     Past Medical History:   Diagnosis Date    Anxiety     Arthritis     Diabetes mellitus     Elevated cholesterol     Hypertension      Past Surgical History:   Procedure Laterality Date    COLONOSCOPY N/A 8/15/2017    Procedure: COLONOSCOPY FOR SCREENING;  Surgeon: Iain Wallace III, MD;  Location: [facility] OR;  Service:     COLONOSCOPY N/A 5/2/2023    Procedure: COLONOSCOPY;  Surgeon: Bhavna Maxwell MD;  Location: [facility] OR;  Service: Gastroenterology;  Laterality: N/A;  TATTOOED ILEOCECAL VALVE POLYP    COLONOSCOPY N/A 1/10/2024    Procedure: COLONOSCOPY;  Surgeon: Bhavna Maxwell MD;  Location: [facility] OR;  Service: Gastroenterology;  Laterality: N/A;    VAGINAL DELIVERY  1982; 1983    2  daughters     Social History     Occupational History     Comment: homemaker   Tobacco Use    Smoking status: Former     Current packs/day: 1.00     Average packs/day: 1 pack/day for 20.0 years (20.0 ttl pk-yrs)     Types: Cigarettes     Start date: 8/29/2016    Smokeless tobacco: Never   Vaping Use    Vaping status: Some Days    Substances: Nicotine, Flavoring   Substance and Sexual Activity    Alcohol use: No    Drug use: No    Sexual activity: Defer     Partners: Male     Comment: Krishan Novoa  reports that she has quit smoking. Her smoking use included cigarettes. She started smoking about 8 years ago. She has a 20 pack-year smoking history. She has never used smokeless tobacco. I have educated her on the risk of diseases from using tobacco products such as cancer, COPD, and heart disease.           Social History     Social History Narrative    Not on file     Family History   Problem Relation Age of Onset    Arthritis Mother     Cancer Mother     Diabetes Mother     Hypertension Mother     Cancer Father     Hypertension Father     Diabetes Maternal Aunt     Diabetes Maternal Uncle     Diabetes Paternal Aunt     Diabetes Paternal Uncle     Breast cancer Neg Hx      Current Outpatient Medications   Medication Sig Dispense Refill    amLODIPine (NORVASC) 10 MG tablet Take 1 tablet by mouth Daily.  5    atenolol (TENORMIN) 50 MG tablet Take 1 tablet by mouth Daily.      cetirizine (ZyrTEC) 10 MG tablet   5    fluticasone (FLONASE) 50 MCG/ACT nasal spray   2    gabapentin (NEURONTIN) 300 MG capsule 800 mg 3 (Three) Times a Day.  5    hydroCHLOROthiazide (HYDRODIURIL) 25 MG tablet Take 1 tablet by mouth Daily.      Insulin Glargine (BASAGLAR KWIKPEN SC) Inject 60 Units under the skin into the appropriate area as directed Daily.      levothyroxine (SYNTHROID, LEVOTHROID) 50 MCG tablet Take 1 tablet by mouth Daily.      methocarbamol (ROBAXIN) 750 MG tablet Take 1 tablet by mouth 2 (Two) Times a Day  "As Needed.      pravastatin (PRAVACHOL) 40 MG tablet   5    sertraline (ZOLOFT) 50 MG tablet   5    EASY TOUCH PEN NEEDLES 31G X 5 MM misc  (Patient not taking: Reported on 11/19/2024)  5     No current facility-administered medications for this visit.     No Known Allergies         Review of Systems   Constitutional: Negative.   HENT: Negative.     Eyes: Negative.    Cardiovascular: Negative.    Respiratory: Negative.     Endocrine: Negative.    Hematologic/Lymphatic: Negative.    Skin: Negative.    Musculoskeletal:  Positive for back pain and joint pain.        Pertinent positives listed in HPI   Gastrointestinal: Negative.    Genitourinary: Negative.    Neurological: Negative.    Psychiatric/Behavioral:  The patient is nervous/anxious.    Allergic/Immunologic: Negative.          Objective      Vitals:    11/19/24 1257   BP: 126/79   Pulse: 76   Weight: 73.9 kg (163 lb)   Height: 162.6 cm (64\")             Physical Exam  Vitals and nursing note reviewed.   Constitutional:       General: She is not in acute distress.     Appearance: She is not ill-appearing.   HENT:      Head: Normocephalic and atraumatic.      Right Ear: External ear normal.      Left Ear: External ear normal.      Nose: Nose normal. No congestion or rhinorrhea.   Eyes:      Extraocular Movements: Extraocular movements intact.      Conjunctiva/sclera: Conjunctivae normal.      Pupils: Pupils are equal, round, and reactive to light.   Cardiovascular:      Rate and Rhythm: Normal rate.      Pulses: Normal pulses.   Pulmonary:      Effort: Pulmonary effort is normal. No respiratory distress.      Breath sounds: No stridor.   Abdominal:      General: There is no distension.   Musculoskeletal:      Cervical back: Normal range of motion.      Comments: Left hand first digit reveals tenderness and grind testing first CMC joint with palpable swelling and nodule A1 pulley with inability to fully flex.  There is no active catching however the patient is " unable to flex MCP past 80 degrees.  Neurovascular status grossly intact   Skin:     General: Skin is warm and dry.      Capillary Refill: Capillary refill takes less than 2 seconds.   Neurological:      General: No focal deficit present.      Mental Status: She is alert and oriented to person, place, and time.   Psychiatric:         Mood and Affect: Mood normal.         Behavior: Behavior normal.         Thought Content: Thought content normal.         Judgment: Judgment normal.                 Radiology:      XR Finger 2+ View Left    Result Date: 11/5/2024  1.  No acute fracture or dislocation. 2.  Arthritic change at the first carpometacarpal joint.   This report was finalized on 11/5/2024 12:36 PM by Dr. Sanjeev Morgan MD.             Assessment/Plan        ICD-10-CM ICD-9-CM   1. Trigger thumb of left hand  M65.312 727.03         59-year-old female with notable several week history of left first digit trigger thumb.  The patient has been intermittently using thumb spica brace as well as some therapeutic anti-inflammatory occasion.  Today further discussion was had with the patient in relation to potential A1 pulley injection versus conservative treatment versus surgical intervention.  The patient would like to implement anti-inflammatory medication with therapeutic Naprosyn 500 mg 1 p.o. twice daily dispense #60.  The patient will implement the bracing during the day over the next 2 weeks.  The patient was instructed to return back at that time for further evaluation.  If no significant alleviation we discussed potential for A1 pulley injection.                    This document was signed by Silas Francisco PA-C November 19, 2024     CC: Krystyna Drake APRN      Dictated Utilizing Dragon Dictation:   Please note that portions of this note were completed with a voice recognition program.   Part of this note may be an electronic transcription/translation of spoken language to printed text using the Dragon  Dictation System.

## 2024-12-16 ENCOUNTER — OFFICE VISIT (OUTPATIENT)
Dept: ORTHOPEDIC SURGERY | Facility: CLINIC | Age: 59
End: 2024-12-16
Payer: COMMERCIAL

## 2024-12-16 VITALS — HEIGHT: 64 IN | WEIGHT: 163 LBS | BODY MASS INDEX: 27.83 KG/M2

## 2024-12-16 DIAGNOSIS — M65.312 TRIGGER THUMB OF LEFT HAND: Primary | ICD-10-CM

## 2024-12-16 RX ORDER — SPIRONOLACTONE 50 MG/1
TABLET, FILM COATED ORAL
COMMUNITY
Start: 2024-12-05

## 2024-12-16 RX ORDER — METHYLPREDNISOLONE ACETATE 80 MG/ML
40 INJECTION, SUSPENSION INTRA-ARTICULAR; INTRALESIONAL; INTRAMUSCULAR; SOFT TISSUE
Status: COMPLETED | OUTPATIENT
Start: 2024-12-16 | End: 2024-12-16

## 2024-12-16 RX ORDER — LIDOCAINE HYDROCHLORIDE 10 MG/ML
1 INJECTION, SOLUTION EPIDURAL; INFILTRATION; INTRACAUDAL; PERINEURAL
Status: COMPLETED | OUTPATIENT
Start: 2024-12-16 | End: 2024-12-16

## 2024-12-16 RX ORDER — CLONIDINE HYDROCHLORIDE 0.2 MG/1
TABLET ORAL
COMMUNITY
Start: 2024-12-05

## 2024-12-16 RX ADMIN — LIDOCAINE HYDROCHLORIDE 1 ML: 10 INJECTION, SOLUTION EPIDURAL; INFILTRATION; INTRACAUDAL; PERINEURAL at 11:04

## 2024-12-16 RX ADMIN — METHYLPREDNISOLONE ACETATE 40 MG: 80 INJECTION, SUSPENSION INTRA-ARTICULAR; INTRALESIONAL; INTRAMUSCULAR; SOFT TISSUE at 11:04

## 2024-12-16 NOTE — PROGRESS NOTES
[facility] [place] [facility] Established Patient Visit          Patient: Zakia Novoa  YOB: 1965  Date of Encounter: 12/16/2024  PCP: Krystyna Drake APRN      Subjective     Chief Complaint   Patient presents with    Left Hand - Follow-up, Pain           History of Present Illness:     Zakia Novoa is a 59 y.o. female presents today as result of left thumb pain and stiffness several weeks duration.  The patient states that she was diagnosed with trigger thumb approximately 10 to 12 years ago with resolution.  Patient reports dull throbbing aching pain to the base of the thumb with difficulty upon attempted full flexion.  Patient denies any paresthesias.  She reports that this began catching and locking however now she is not able to fully flex her thumb.  She has been implementing NSAIDs and bracing without improvement. No other new complaints.  Denies any paresthesias        Patient Active Problem List   Diagnosis    Visit for wound check    Degenerative disc disease, lumbar    Positive colorectal cancer screening using Cologuard test    Encounter for screening for malignant neoplasm of colon     Past Medical History:   Diagnosis Date    Anxiety     Arthritis     Diabetes mellitus     Elevated cholesterol     Hypertension      Past Surgical History:   Procedure Laterality Date    COLONOSCOPY N/A 8/15/2017    Procedure: COLONOSCOPY FOR SCREENING;  Surgeon: Iain Wallace III, MD;  Location: [facility] OR;  Service:     COLONOSCOPY N/A 5/2/2023    Procedure: COLONOSCOPY;  Surgeon: Bhavna Maxwell MD;  Location: [facility] OR;  Service: Gastroenterology;  Laterality: N/A;  TATTOOED ILEOCECAL VALVE POLYP    COLONOSCOPY N/A 1/10/2024    Procedure: COLONOSCOPY;  Surgeon: Bhavna Maxwell MD;  Location: [facility] OR;  Service: Gastroenterology;  Laterality: N/A;    VAGINAL DELIVERY  1982; 1983    2 daughters     Social History     Occupational History     Comment: homemaker    Tobacco Use    Smoking status: Former     Current packs/day: 1.00     Average packs/day: 1 pack/day for 20.0 years (20.0 ttl pk-yrs)     Types: Cigarettes     Start date: 8/29/2016    Smokeless tobacco: Never   Vaping Use    Vaping status: Some Days    Substances: Nicotine, Flavoring   Substance and Sexual Activity    Alcohol use: No    Drug use: No    Sexual activity: Defer     Partners: Male     Comment: Krishan Novoa  reports that she has quit smoking. Her smoking use included cigarettes. She started smoking about 8 years ago. She has a 20 pack-year smoking history. She has never used smokeless tobacco. I have educated her on the risk of diseases from using tobacco products such as cancer, COPD, and heart disease.           Social History     Social History Narrative    Not on file     Family History   Problem Relation Age of Onset    Arthritis Mother     Cancer Mother     Diabetes Mother     Hypertension Mother     Cancer Father     Hypertension Father     Diabetes Maternal Aunt     Diabetes Maternal Uncle     Diabetes Paternal Aunt     Diabetes Paternal Uncle     Breast cancer Neg Hx      Current Outpatient Medications   Medication Sig Dispense Refill    amLODIPine (NORVASC) 10 MG tablet Take 1 tablet by mouth Daily.  5    atenolol (TENORMIN) 50 MG tablet Take 1 tablet by mouth Daily.      cetirizine (ZyrTEC) 10 MG tablet   5    cloNIDine (CATAPRES) 0.2 MG tablet       EASY TOUCH PEN NEEDLES 31G X 5 MM misc   5    fluticasone (FLONASE) 50 MCG/ACT nasal spray   2    gabapentin (NEURONTIN) 300 MG capsule 800 mg 3 (Three) Times a Day.  5    hydroCHLOROthiazide (HYDRODIURIL) 25 MG tablet Take 1 tablet by mouth Daily.      Insulin Glargine (BASAGLAR KWIKPEN SC) Inject 60 Units under the skin into the appropriate area as directed Daily.      levothyroxine (SYNTHROID, LEVOTHROID) 50 MCG tablet Take 1 tablet by mouth Daily.      methocarbamol (ROBAXIN) 750 MG tablet Take 1 tablet by mouth 2 (Two)  "Times a Day As Needed.      naproxen (NAPROSYN) 500 MG tablet Take 1 tablet by mouth 2 (Two) Times a Day With Meals. 60 tablet 2    pravastatin (PRAVACHOL) 40 MG tablet   5    sertraline (ZOLOFT) 50 MG tablet   5    spironolactone (ALDACTONE) 50 MG tablet        No current facility-administered medications for this visit.     No Known Allergies         Review of Systems   Constitutional: Negative.   HENT: Negative.     Eyes: Negative.    Cardiovascular: Negative.    Respiratory: Negative.     Endocrine: Negative.    Hematologic/Lymphatic: Negative.    Skin: Negative.    Musculoskeletal:  Positive for back pain and joint pain.        Pertinent positives listed in HPI   Gastrointestinal: Negative.    Genitourinary: Negative.    Neurological: Negative.    Psychiatric/Behavioral:  The patient is nervous/anxious.    Allergic/Immunologic: Negative.          Objective      Vitals:    12/16/24 1049   Weight: 73.9 kg (163 lb)   Height: 162.6 cm (64\")             Physical Exam  Vitals and nursing note reviewed.   Constitutional:       General: She is not in acute distress.     Appearance: She is not ill-appearing.   HENT:      Head: Normocephalic and atraumatic.      Right Ear: External ear normal.      Left Ear: External ear normal.      Nose: Nose normal. No congestion or rhinorrhea.   Eyes:      Extraocular Movements: Extraocular movements intact.      Conjunctiva/sclera: Conjunctivae normal.      Pupils: Pupils are equal, round, and reactive to light.   Cardiovascular:      Rate and Rhythm: Normal rate.      Pulses: Normal pulses.   Pulmonary:      Effort: Pulmonary effort is normal. No respiratory distress.      Breath sounds: No stridor.   Abdominal:      General: There is no distension.   Musculoskeletal:      Cervical back: Normal range of motion.      Comments: Left hand first digit reveals tenderness and grind testing first CMC joint with palpable swelling and nodule A1 pulley with inability to fully flex.  There is " no active catching however the patient is unable to flex MCP past 80 degrees.  Neurovascular status grossly intact   Skin:     General: Skin is warm and dry.      Capillary Refill: Capillary refill takes less than 2 seconds.   Neurological:      General: No focal deficit present.      Mental Status: She is alert and oriented to person, place, and time.   Psychiatric:         Mood and Affect: Mood normal.         Behavior: Behavior normal.         Thought Content: Thought content normal.         Judgment: Judgment normal.                 Radiology:      No radiology results for the last 30 days.          Assessment/Plan        ICD-10-CM ICD-9-CM   1. Trigger thumb of left hand  M65.312 727.03       59-year-old female with notable several week history of left first digit trigger thumb.  The patient has been using thumb spica brace as well as NSAIDs with minimal alleviation.  Today secondary to the continuation of pain and symptoms the patient was provided with an A1 pulley injection with 40 mg Depo-Medrol with lidocaine block injected into tendon sheath left first A1 pulley.  Patient tolerated this procedure well.  She was instructed to return back in 3 weeks for further evaluation of the efficacy of conservative treatment.  No direct surgical indication currently.       Left 1st digit A1 Pulley trigger finger injection    Date/Time: 12/16/2024 11:04 AM    Performed by: Silas Francisco PA  Authorized by: Silas Francisco PA  Consent: Verbal consent obtained.  Risks and benefits: risks, benefits and alternatives were discussed  Consent given by: patient  Patient understanding: patient states understanding of the procedure being performed  Site marked: the operative site was marked  Imaging studies: imaging studies available  Patient identity confirmed: verbally with patient  Local anesthesia used: yes  Anesthesia: local infiltration    Anesthesia:  Local anesthesia used: yes  Local Anesthetic: lidocaine 1% without  epinephrine  Anesthetic total: 1 mL  Medications administered: 40 mg methylPREDNISolone acetate 80 MG/ML; 1 mL lidocaine PF 1% 1 %                      This document was signed by Silas Francisco PA-C December 16, 2024     CC: Krystyna Drake APRN      Dictated Utilizing Dragon Dictation:   Please note that portions of this note were completed with a voice recognition program.   Part of this note may be an electronic transcription/translation of spoken language to printed text using the Dragon Dictation System.

## 2025-01-24 ENCOUNTER — OFFICE VISIT (OUTPATIENT)
Dept: ORTHOPEDIC SURGERY | Facility: CLINIC | Age: 60
End: 2025-01-24
Payer: COMMERCIAL

## 2025-01-24 VITALS — WEIGHT: 163 LBS | HEIGHT: 64 IN | BODY MASS INDEX: 27.83 KG/M2

## 2025-01-24 DIAGNOSIS — M65.312 TRIGGER THUMB OF LEFT HAND: Primary | ICD-10-CM

## 2025-01-24 PROCEDURE — 99213 OFFICE O/P EST LOW 20 MIN: CPT | Performed by: PHYSICIAN ASSISTANT

## 2025-01-24 NOTE — PROGRESS NOTES
[facility]opaedic Surgery Established Patient Visit          Patient: Zakia Novoa  YOB: 1965  Date of Encounter: 1/24/2025  PCP: Krystyna Drake APRN      Subjective     No chief complaint on file.          History of Present Illness:     Zakia Novoa is a 59 y.o. female presents today as result of left thumb pain and stiffness several weeks duration.  The patient states that she was diagnosed with trigger thumb approximately 10 to 12 years ago with resolution.  Patient reports dull throbbing aching pain to the base of the thumb with difficulty upon attempted full flexion.  Patient denies any paresthesias.  She reports that this began catching and locking however now she is not able to fully flex her thumb.  She has been implementing NSAIDs and bracing without improvement.  Most recent and previous tendon sheath A1 pulley injection has been nonbeneficial.  She continues to have ongoing pain and symptoms and complaints consistent with the trigger thumb.       Patient Active Problem List   Diagnosis    Visit for wound check    Degenerative disc disease, lumbar    Positive colorectal cancer screening using Cologuard test    Encounter for screening for malignant neoplasm of colon     Past Medical History:   Diagnosis Date    Anxiety     Arthritis     Diabetes mellitus     Elevated cholesterol     Hypertension      Past Surgical History:   Procedure Laterality Date    COLONOSCOPY N/A 8/15/2017    Procedure: COLONOSCOPY FOR SCREENING;  Surgeon: Iain Wallace III, MD;  Location: [facility] OR;  Service:     COLONOSCOPY N/A 5/2/2023    Procedure: COLONOSCOPY;  Surgeon: Bhavna Maxwell MD;  Location: [facility] OR;  Service: Gastroenterology;  Laterality: N/A;  TATTOOED ILEOCECAL VALVE POLYP    COLONOSCOPY N/A 1/10/2024    Procedure: COLONOSCOPY;  Surgeon: Bhavna Maxwell MD;  Location: [facility] OR;  Service: Gastroenterology;  Laterality: N/A;    VAGINAL DELIVERY  1982; 1983     2 daughters     Social History     Occupational History     Comment: homemaker   Tobacco Use    Smoking status: Former     Current packs/day: 1.00     Average packs/day: 1 pack/day for 20.0 years (20.0 ttl pk-yrs)     Types: Cigarettes     Start date: 8/29/2016    Smokeless tobacco: Never   Vaping Use    Vaping status: Some Days    Substances: Nicotine, Flavoring   Substance and Sexual Activity    Alcohol use: No    Drug use: No    Sexual activity: Defer     Partners: Male     Comment: Krishan Novoa  reports that she has quit smoking. Her smoking use included cigarettes. She started smoking about 8 years ago. She has a 20 pack-year smoking history. She has never used smokeless tobacco. I have educated her on the risk of diseases from using tobacco products such as cancer, COPD, and heart disease.           Social History     Social History Narrative    Not on file     Family History   Problem Relation Age of Onset    Arthritis Mother     Cancer Mother     Diabetes Mother     Hypertension Mother     Cancer Father     Hypertension Father     Diabetes Maternal Aunt     Diabetes Maternal Uncle     Diabetes Paternal Aunt     Diabetes Paternal Uncle     Breast cancer Neg Hx      Current Outpatient Medications   Medication Sig Dispense Refill    amLODIPine (NORVASC) 10 MG tablet Take 1 tablet by mouth Daily.  5    atenolol (TENORMIN) 50 MG tablet Take 1 tablet by mouth Daily.      cetirizine (ZyrTEC) 10 MG tablet   5    cloNIDine (CATAPRES) 0.2 MG tablet       EASY TOUCH PEN NEEDLES 31G X 5 MM misc   5    fluticasone (FLONASE) 50 MCG/ACT nasal spray   2    gabapentin (NEURONTIN) 300 MG capsule 800 mg 3 (Three) Times a Day.  5    hydroCHLOROthiazide (HYDRODIURIL) 25 MG tablet Take 1 tablet by mouth Daily.      Insulin Glargine (BASAGLAR KWIKPEN SC) Inject 60 Units under the skin into the appropriate area as directed Daily.      levothyroxine (SYNTHROID, LEVOTHROID) 50 MCG tablet Take 1 tablet by mouth  Daily.      methocarbamol (ROBAXIN) 750 MG tablet Take 1 tablet by mouth 2 (Two) Times a Day As Needed.      naproxen (NAPROSYN) 500 MG tablet Take 1 tablet by mouth 2 (Two) Times a Day With Meals. 60 tablet 2    pravastatin (PRAVACHOL) 40 MG tablet   5    sertraline (ZOLOFT) 50 MG tablet   5    spironolactone (ALDACTONE) 50 MG tablet        No current facility-administered medications for this visit.     No Known Allergies         Review of Systems   Constitutional: Negative.   HENT: Negative.     Eyes: Negative.    Cardiovascular: Negative.    Respiratory: Negative.     Endocrine: Negative.    Hematologic/Lymphatic: Negative.    Skin: Negative.    Musculoskeletal:  Positive for back pain and joint pain.        Pertinent positives listed in HPI   Gastrointestinal: Negative.    Genitourinary: Negative.    Neurological: Negative.    Psychiatric/Behavioral:  The patient is nervous/anxious.    Allergic/Immunologic: Negative.          Objective      There were no vitals filed for this visit.            Physical Exam  Vitals and nursing note reviewed.   Constitutional:       General: She is not in acute distress.     Appearance: She is not ill-appearing.   HENT:      Head: Normocephalic and atraumatic.      Right Ear: External ear normal.      Left Ear: External ear normal.      Nose: Nose normal. No congestion or rhinorrhea.   Eyes:      Extraocular Movements: Extraocular movements intact.      Conjunctiva/sclera: Conjunctivae normal.      Pupils: Pupils are equal, round, and reactive to light.   Cardiovascular:      Rate and Rhythm: Normal rate.      Pulses: Normal pulses.   Pulmonary:      Effort: Pulmonary effort is normal. No respiratory distress.      Breath sounds: No stridor.   Abdominal:      General: There is no distension.   Musculoskeletal:      Cervical back: Normal range of motion.      Comments: Left hand first digit reveals tenderness and grind testing first CMC joint with palpable swelling and nodule A1  pulley with inability to fully flex.  There is no active catching however the patient is unable to flex MCP past 80 degrees.  Neurovascular status grossly intact   Skin:     General: Skin is warm and dry.      Capillary Refill: Capillary refill takes less than 2 seconds.   Neurological:      General: No focal deficit present.      Mental Status: She is alert and oriented to person, place, and time.   Psychiatric:         Mood and Affect: Mood normal.         Behavior: Behavior normal.         Thought Content: Thought content normal.         Judgment: Judgment normal.               Radiology:      No radiology results for the last 30 days.          Assessment/Plan        ICD-10-CM ICD-9-CM   1. Trigger thumb of left hand  M65.312 727.03       59-year-old female with notable several month history of left first digit trigger thumb.  The patient has implemented thumb spica bracing as well as NSAIDs with minimal to no alleviation.  The patient is also undergone previous A1 pulley injection with 40 mg Depo-Medrol with only temporary and partial alleviation.  She continues to have pain and symptoms with active catching and painful nodule along the A1 pulley.  As result of this the patient was encouraged to implement consultation with Dr. Aleman 2/10/2025 to discuss surgical trigger finger release as she has remained recalcitrant to conservative treatment                    This document was signed by Silas Francisco PA-C January 24, 2025     CC: Krystyna Drake APRN      Dictated Utilizing Dragon Dictation:   Please note that portions of this note were completed with a voice recognition program.   Part of this note may be an electronic transcription/translation of spoken language to printed text using the Dragon Dictation System.

## 2025-02-10 ENCOUNTER — OFFICE VISIT (OUTPATIENT)
Dept: ORTHOPEDIC SURGERY | Facility: CLINIC | Age: 60
End: 2025-02-10
Payer: COMMERCIAL

## 2025-02-10 VITALS
WEIGHT: 162.92 LBS | HEIGHT: 64 IN | HEART RATE: 80 BPM | SYSTOLIC BLOOD PRESSURE: 133 MMHG | BODY MASS INDEX: 27.81 KG/M2 | DIASTOLIC BLOOD PRESSURE: 79 MMHG

## 2025-02-10 DIAGNOSIS — M65.312 TRIGGER THUMB OF LEFT HAND: Primary | ICD-10-CM

## 2025-02-10 PROCEDURE — 99213 OFFICE O/P EST LOW 20 MIN: CPT | Performed by: ORTHOPAEDIC SURGERY

## 2025-02-10 NOTE — PROGRESS NOTES
Follow-up Visit         Patient: Zakia Novoa  YOB: 1965  Date of Encounter: 02/10/2025      Chief  Complaint:   Chief Complaint   Patient presents with    Left Hand - Follow-up         HPI:  Zakia Novoa, 59 y.o. female presents in follow-up left hand pain she received steroid injection left thumb for trigger thumb.  She reports that she is no longer having locking of her thumb her motion has improved and her pain has improved.        Medical History:  Patient Active Problem List   Diagnosis    Visit for wound check    Degenerative disc disease, lumbar    Positive colorectal cancer screening using Cologuard test    Encounter for screening for malignant neoplasm of colon     Past Medical History:   Diagnosis Date    Anxiety     Arthritis     Diabetes mellitus     Elevated cholesterol     Hypertension            Social History:  Social History     Socioeconomic History    Marital status:      Spouse name: Krishan     Number of children: 2    Years of education: 7   Tobacco Use    Smoking status: Former     Current packs/day: 1.00     Average packs/day: 1 pack/day for 20.0 years (20.0 ttl pk-yrs)     Types: Cigarettes     Start date: 8/29/2016    Smokeless tobacco: Never   Vaping Use    Vaping status: Some Days    Substances: Nicotine, Flavoring   Substance and Sexual Activity    Alcohol use: No    Drug use: No    Sexual activity: Defer     Partners: Male     Comment: Krishan Novoa           Current Medications:    Current Outpatient Medications:     amLODIPine (NORVASC) 10 MG tablet, Take 1 tablet by mouth Daily., Disp: , Rfl: 5    atenolol (TENORMIN) 50 MG tablet, Take 1 tablet by mouth Daily., Disp: , Rfl:     cetirizine (ZyrTEC) 10 MG tablet, , Disp: , Rfl: 5    cloNIDine (CATAPRES) 0.2 MG tablet, , Disp: , Rfl:     EASY TOUCH PEN NEEDLES 31G X 5 MM misc, , Disp: , Rfl: 5    fluticasone (FLONASE) 50 MCG/ACT nasal spray, , Disp: , Rfl: 2    gabapentin (NEURONTIN) 300 MG capsule, 800 mg  "3 (Three) Times a Day., Disp: , Rfl: 5    hydroCHLOROthiazide (HYDRODIURIL) 25 MG tablet, Take 1 tablet by mouth Daily., Disp: , Rfl:     Insulin Glargine (BASAGLAR KWIKPEN SC), Inject 60 Units under the skin into the appropriate area as directed Daily., Disp: , Rfl:     levothyroxine (SYNTHROID, LEVOTHROID) 50 MCG tablet, Take 1 tablet by mouth Daily., Disp: , Rfl:     methocarbamol (ROBAXIN) 750 MG tablet, Take 1 tablet by mouth 2 (Two) Times a Day As Needed., Disp: , Rfl:     naproxen (NAPROSYN) 500 MG tablet, Take 1 tablet by mouth 2 (Two) Times a Day With Meals., Disp: 60 tablet, Rfl: 2    pravastatin (PRAVACHOL) 40 MG tablet, , Disp: , Rfl: 5    sertraline (ZOLOFT) 50 MG tablet, , Disp: , Rfl: 5    spironolactone (ALDACTONE) 50 MG tablet, , Disp: , Rfl:         Allergies:  No Known Allergies        Family History:  Family History   Problem Relation Age of Onset    Arthritis Mother     Cancer Mother     Diabetes Mother     Hypertension Mother     Cancer Father     Hypertension Father     Diabetes Maternal Aunt     Diabetes Maternal Uncle     Diabetes Paternal Aunt     Diabetes Paternal Uncle     Breast cancer Neg Hx            Surgical History:  Past Surgical History:   Procedure Laterality Date    COLONOSCOPY N/A 8/15/2017    Procedure: COLONOSCOPY FOR SCREENING;  Surgeon: Iain Wallace III, MD;  Location: [facility] OR;  Service:     COLONOSCOPY N/A 5/2/2023    Procedure: COLONOSCOPY;  Surgeon: Bhavna Maxwell MD;  Location: [facility] OR;  Service: Gastroenterology;  Laterality: N/A;  TATTOOED ILEOCECAL VALVE POLYP    COLONOSCOPY N/A 1/10/2024    Procedure: COLONOSCOPY;  Surgeon: Bhavna Maxwell MD;  Location: [facility] OR;  Service: Gastroenterology;  Laterality: N/A;    VAGINAL DELIVERY  1982; 1983    2 daughters         Vitals:  Vitals:    02/10/25 1324   BP: 133/79   Pulse: 80   Weight: 73.9 kg (162 lb 14.7 oz)   Height: 162.6 cm (64.02\")     Body mass index is 27.95 " kg/m².        Orthopedic Examination: Hand reveals slight prominence directly over A1 pulley of her thumb she demonstrates full flexion and extension of the IP joint MCP joint no triggering.  She has normal sensation.          Assessment & Plan:   59 y.o. female presents for follow-up doing well after steroid injection for left trigger thumb.  She will return in the future with recurrence otherwise she is doing well.           Diagnosis Plan   1. Trigger thumb of left hand                    Cc:  Krystyna Drake APRN              This document has been electronically signed by Dharmesh Aleman MD   February 11, 2025 07:53 EST

## 2025-02-10 NOTE — PROGRESS NOTES
History and Physical      Patient: Zakia Novoa  YOB: 1965  Date of Encounter: 02/10/2025      Chief Complaint:   No chief complaint on file.          HPI:   Zakia Novoa, 59 y.o. female, presents for        Active Problem List:  Patient Active Problem List   Diagnosis    Visit for wound check    Degenerative disc disease, lumbar    Positive colorectal cancer screening using Cologuard test    Encounter for screening for malignant neoplasm of colon           Past Medical History:  Past Medical History:   Diagnosis Date    Anxiety     Arthritis     Diabetes mellitus     Elevated cholesterol     Hypertension            Past Surgical History:  Past Surgical History:   Procedure Laterality Date    COLONOSCOPY N/A 8/15/2017    Procedure: COLONOSCOPY FOR SCREENING;  Surgeon: Iain Wallace III, MD;  Location: [facility] OR;  Service:     COLONOSCOPY N/A 5/2/2023    Procedure: COLONOSCOPY;  Surgeon: Bhavna Maxwell MD;  Location: [facility] OR;  Service: Gastroenterology;  Laterality: N/A;  TATTOOED ILEOCECAL VALVE POLYP    COLONOSCOPY N/A 1/10/2024    Procedure: COLONOSCOPY;  Surgeon: Bhavna Maxwell MD;  Location: [facility] OR;  Service: Gastroenterology;  Laterality: N/A;    VAGINAL DELIVERY  1982; 1983    2 daughters           Family History:  Family History   Problem Relation Age of Onset    Arthritis Mother     Cancer Mother     Diabetes Mother     Hypertension Mother     Cancer Father     Hypertension Father     Diabetes Maternal Aunt     Diabetes Maternal Uncle     Diabetes Paternal Aunt     Diabetes Paternal Uncle     Breast cancer Neg Hx            Social History:  Social History     Socioeconomic History    Marital status:      Spouse name: Krishan     Number of children: 2    Years of education: 7   Tobacco Use    Smoking status: Former     Current packs/day: 1.00     Average packs/day: 1 pack/day for 20.0 years (20.0 ttl pk-yrs)     Types: Cigarettes     Start date:  8/29/2016    Smokeless tobacco: Never   Vaping Use    Vaping status: Some Days    Substances: Nicotine, Flavoring   Substance and Sexual Activity    Alcohol use: No    Drug use: No    Sexual activity: Defer     Partners: Male     Comment: Krishan Novoa     There is no height or weight on file to calculate BMI.        Medications:  Current Outpatient Medications   Medication Sig Dispense Refill    amLODIPine (NORVASC) 10 MG tablet Take 1 tablet by mouth Daily.  5    atenolol (TENORMIN) 50 MG tablet Take 1 tablet by mouth Daily.      cetirizine (ZyrTEC) 10 MG tablet   5    cloNIDine (CATAPRES) 0.2 MG tablet       EASY TOUCH PEN NEEDLES 31G X 5 MM misc   5    fluticasone (FLONASE) 50 MCG/ACT nasal spray   2    gabapentin (NEURONTIN) 300 MG capsule 800 mg 3 (Three) Times a Day.  5    hydroCHLOROthiazide (HYDRODIURIL) 25 MG tablet Take 1 tablet by mouth Daily.      Insulin Glargine (BASAGLAR KWIKPEN SC) Inject 60 Units under the skin into the appropriate area as directed Daily.      levothyroxine (SYNTHROID, LEVOTHROID) 50 MCG tablet Take 1 tablet by mouth Daily.      methocarbamol (ROBAXIN) 750 MG tablet Take 1 tablet by mouth 2 (Two) Times a Day As Needed.      naproxen (NAPROSYN) 500 MG tablet Take 1 tablet by mouth 2 (Two) Times a Day With Meals. 60 tablet 2    pravastatin (PRAVACHOL) 40 MG tablet   5    sertraline (ZOLOFT) 50 MG tablet   5    spironolactone (ALDACTONE) 50 MG tablet        No current facility-administered medications for this visit.           Allergies:  No Known Allergies        Review of Systems:   Review of Systems        Physical Exam:   Physical Exam  GENERAL: 59 y.o. female, alert and oriented X 3 in no acute distress.   There were no vitals taken for this visit.            Musculoskeletal Examination:          Radiology/Labs:    No radiology results for the last 30 days.        Radiographs         Assessment & Plan:   59 y.o. female presents    No diagnosis found.        Cc:   Krystyna Drake  CURTIS Mendez              This document has been electronically signed by Dharmesh Aleman MD   February 10, 2025 13:21 EST

## 2025-02-28 ENCOUNTER — TRANSCRIBE ORDERS (OUTPATIENT)
Dept: ADMINISTRATIVE | Facility: HOSPITAL | Age: 60
End: 2025-02-28
Payer: COMMERCIAL

## 2025-02-28 DIAGNOSIS — Z12.31 VISIT FOR SCREENING MAMMOGRAM: Primary | ICD-10-CM

## 2025-03-03 DIAGNOSIS — Z12.11 ENCOUNTER FOR SCREENING FOR MALIGNANT NEOPLASM OF COLON: Primary | ICD-10-CM

## 2025-03-03 RX ORDER — BISACODYL 5 MG/1
5 TABLET, DELAYED RELEASE ORAL DAILY PRN
Qty: 4 TABLET | Refills: 0 | Status: SHIPPED | OUTPATIENT
Start: 2025-03-03

## 2025-03-03 RX ORDER — POLYETHYLENE GLYCOL 3350 17 G/17G
17 POWDER, FOR SOLUTION ORAL DAILY
Qty: 510 G | Refills: 0 | Status: SHIPPED | OUTPATIENT
Start: 2025-03-03

## 2025-04-02 ENCOUNTER — HOSPITAL ENCOUNTER (OUTPATIENT)
Dept: MAMMOGRAPHY | Facility: HOSPITAL | Age: 60
Discharge: HOME | End: 2025-04-02
Admitting: NURSE PRACTITIONER
Payer: COMMERCIAL

## 2025-04-02 DIAGNOSIS — Z12.31 VISIT FOR SCREENING MAMMOGRAM: ICD-10-CM

## 2025-04-02 PROCEDURE — 77063 BREAST TOMOSYNTHESIS BI: CPT

## 2025-04-02 PROCEDURE — 77067 SCR MAMMO BI INCL CAD: CPT

## 2025-05-07 ENCOUNTER — HOSPITAL ENCOUNTER (OUTPATIENT)
Dept: GENERAL RADIOLOGY | Facility: HOSPITAL | Age: 60
Discharge: HOME | End: 2025-05-07
Admitting: NURSE PRACTITIONER
Payer: COMMERCIAL

## 2025-05-07 ENCOUNTER — TRANSCRIBE ORDERS (OUTPATIENT)
Dept: ADMINISTRATIVE | Facility: HOSPITAL | Age: 60
End: 2025-05-07
Payer: COMMERCIAL

## 2025-05-07 DIAGNOSIS — M25.552 LEFT HIP PAIN: ICD-10-CM

## 2025-05-07 DIAGNOSIS — M25.552 LEFT HIP PAIN: Primary | ICD-10-CM

## 2025-05-07 PROCEDURE — 73503 X-RAY EXAM HIP UNI 4/> VIEWS: CPT | Performed by: RADIOLOGY

## 2025-05-07 PROCEDURE — 73503 X-RAY EXAM HIP UNI 4/> VIEWS: CPT

## (undated) DEVICE — Device: Brand: DEFENDO AIR/WATER/SUCTION AND BIOPSY VALVE

## (undated) DEVICE — NDL SCLEROTHRPY INTERJECT 25G 4 240 CLR

## (undated) DEVICE — TUBING, SUCTION, 1/4" X 20', STRAIGHT: Brand: MEDLINE INDUSTRIES, INC.

## (undated) DEVICE — SINGLE PORT MANIFOLD: Brand: NEPTUNE 2

## (undated) DEVICE — THE SINGLE USE ETRAP – POLYP TRAP IS USED FOR SUCTION RETRIEVAL OF ENDOSCOPICALLY REMOVED POLYPS.: Brand: ETRAP

## (undated) DEVICE — CONN Y IRR DISP 1P/U

## (undated) DEVICE — SNAR POLYP CAPTIFLX MICRO OVL 13MM 240CM

## (undated) DEVICE — Device: Brand: SPOT EX ENDOSCOPIC TATTOO

## (undated) DEVICE — ENDOGATOR AUXILIARY WATER JET CONNECTOR: Brand: ENDOGATOR

## (undated) DEVICE — THE EXACTO COLD SNARE IS INTENDED TO BE USED WITHOUT DIATHERMIC ENERGY FOR THE ENDOSCOPIC RESECTION OF POLYP TISSUE IN THE GASTROINTESTINAL TRACT.: Brand: EXACTO

## (undated) DEVICE — FRCP BX RADJAW4 NDL 2.8 240CM LG OG BX40

## (undated) DEVICE — ENDOGATOR TUBING FOR ENDOGATOR EGP-100 IRRIGATION PUMP,OLYMPUS OFP PUMP, OLYMPUS AFU-100 PUMP AND ERBE EIP2 PUMP: Brand: ENDOGATOR

## (undated) DEVICE — POLYP TRAP: Brand: TRAPEASE®

## (undated) DEVICE — Device

## (undated) DEVICE — Device: Brand: ENDOGATOR

## (undated) DEVICE — 4-PORT MANIFOLD: Brand: NEPTUNE 2

## (undated) DEVICE — SYR LUERLOK 30CC